# Patient Record
Sex: FEMALE | Race: ASIAN | NOT HISPANIC OR LATINO
[De-identification: names, ages, dates, MRNs, and addresses within clinical notes are randomized per-mention and may not be internally consistent; named-entity substitution may affect disease eponyms.]

---

## 2018-03-22 PROBLEM — Z00.00 ENCOUNTER FOR PREVENTIVE HEALTH EXAMINATION: Status: ACTIVE | Noted: 2018-03-22

## 2018-04-03 ENCOUNTER — APPOINTMENT (OUTPATIENT)
Dept: UROLOGY | Facility: CLINIC | Age: 68
End: 2018-04-03
Payer: MEDICAID

## 2018-04-03 DIAGNOSIS — Z83.3 FAMILY HISTORY OF DIABETES MELLITUS: ICD-10-CM

## 2018-04-03 DIAGNOSIS — Z86.69 PERSONAL HISTORY OF OTHER DISEASES OF THE NERVOUS SYSTEM AND SENSE ORGANS: ICD-10-CM

## 2018-04-03 DIAGNOSIS — Z78.9 OTHER SPECIFIED HEALTH STATUS: ICD-10-CM

## 2018-04-03 DIAGNOSIS — Z82.49 FAMILY HISTORY OF ISCHEMIC HEART DISEASE AND OTHER DISEASES OF THE CIRCULATORY SYSTEM: ICD-10-CM

## 2018-04-03 DIAGNOSIS — Z80.3 FAMILY HISTORY OF MALIGNANT NEOPLASM OF BREAST: ICD-10-CM

## 2018-04-03 DIAGNOSIS — E78.00 PURE HYPERCHOLESTEROLEMIA, UNSPECIFIED: ICD-10-CM

## 2018-04-03 PROCEDURE — 99204 OFFICE O/P NEW MOD 45 MIN: CPT

## 2018-04-05 PROBLEM — Z78.9 DOES NOT USE TOBACCO: Status: ACTIVE | Noted: 2018-04-03

## 2018-04-05 PROBLEM — Z80.3 FAMILY HISTORY OF MALIGNANT NEOPLASM OF BREAST: Status: ACTIVE | Noted: 2018-04-03

## 2018-04-05 PROBLEM — Z83.3 FAMILY HISTORY OF DIABETES MELLITUS: Status: ACTIVE | Noted: 2018-04-03

## 2018-04-05 PROBLEM — E78.00 ELEVATED CHOLESTEROL: Status: ACTIVE | Noted: 2018-04-05

## 2018-04-05 PROBLEM — Z82.49 FAMILY HISTORY OF HYPERTENSION: Status: ACTIVE | Noted: 2018-04-03

## 2018-04-05 PROBLEM — Z86.69 HISTORY OF SCIATICA: Status: ACTIVE | Noted: 2018-04-05

## 2018-04-05 RX ORDER — ATORVASTATIN CALCIUM 80 MG/1
TABLET, FILM COATED ORAL
Refills: 0 | Status: ACTIVE | COMMUNITY

## 2018-04-10 ENCOUNTER — APPOINTMENT (OUTPATIENT)
Dept: UROLOGY | Facility: CLINIC | Age: 68
End: 2018-04-10

## 2018-04-11 ENCOUNTER — APPOINTMENT (OUTPATIENT)
Dept: UROLOGY | Facility: CLINIC | Age: 68
End: 2018-04-11

## 2018-04-12 ENCOUNTER — FORM ENCOUNTER (OUTPATIENT)
Age: 68
End: 2018-04-12

## 2018-04-13 ENCOUNTER — TRANSCRIPTION ENCOUNTER (OUTPATIENT)
Age: 68
End: 2018-04-13

## 2018-04-13 ENCOUNTER — OUTPATIENT (OUTPATIENT)
Dept: OUTPATIENT SERVICES | Facility: HOSPITAL | Age: 68
LOS: 1 days | End: 2018-04-13
Payer: MEDICAID

## 2018-04-13 ENCOUNTER — APPOINTMENT (OUTPATIENT)
Dept: CT IMAGING | Facility: IMAGING CENTER | Age: 68
End: 2018-04-13
Payer: MEDICAID

## 2018-04-13 DIAGNOSIS — N13.30 UNSPECIFIED HYDRONEPHROSIS: ICD-10-CM

## 2018-04-13 PROCEDURE — 74176 CT ABD & PELVIS W/O CONTRAST: CPT

## 2018-04-13 PROCEDURE — 74176 CT ABD & PELVIS W/O CONTRAST: CPT | Mod: 26

## 2018-04-24 ENCOUNTER — APPOINTMENT (OUTPATIENT)
Dept: UROLOGY | Facility: CLINIC | Age: 68
End: 2018-04-24
Payer: MEDICAID

## 2018-04-24 PROCEDURE — 99213 OFFICE O/P EST LOW 20 MIN: CPT

## 2018-05-15 ENCOUNTER — OUTPATIENT (OUTPATIENT)
Dept: OUTPATIENT SERVICES | Facility: HOSPITAL | Age: 68
LOS: 1 days | End: 2018-05-15
Payer: MEDICAID

## 2018-05-15 VITALS
HEART RATE: 82 BPM | RESPIRATION RATE: 16 BRPM | TEMPERATURE: 97 F | SYSTOLIC BLOOD PRESSURE: 128 MMHG | HEIGHT: 62 IN | DIASTOLIC BLOOD PRESSURE: 80 MMHG | WEIGHT: 167.99 LBS

## 2018-05-15 DIAGNOSIS — N13.30 UNSPECIFIED HYDRONEPHROSIS: ICD-10-CM

## 2018-05-15 DIAGNOSIS — Z90.710 ACQUIRED ABSENCE OF BOTH CERVIX AND UTERUS: Chronic | ICD-10-CM

## 2018-05-15 LAB
APPEARANCE UR: CLEAR — SIGNIFICANT CHANGE UP
BILIRUB UR-MCNC: NEGATIVE — SIGNIFICANT CHANGE UP
BLD GP AB SCN SERPL QL: NEGATIVE — SIGNIFICANT CHANGE UP
BLOOD UR QL VISUAL: NEGATIVE — SIGNIFICANT CHANGE UP
BUN SERPL-MCNC: 26 MG/DL — HIGH (ref 7–23)
CALCIUM SERPL-MCNC: 9.6 MG/DL — SIGNIFICANT CHANGE UP (ref 8.4–10.5)
CHLORIDE SERPL-SCNC: 100 MMOL/L — SIGNIFICANT CHANGE UP (ref 98–107)
CO2 SERPL-SCNC: 29 MMOL/L — SIGNIFICANT CHANGE UP (ref 22–31)
COLOR SPEC: SIGNIFICANT CHANGE UP
CREAT SERPL-MCNC: 1.1 MG/DL — SIGNIFICANT CHANGE UP (ref 0.5–1.3)
GLUCOSE SERPL-MCNC: 89 MG/DL — SIGNIFICANT CHANGE UP (ref 70–99)
GLUCOSE UR-MCNC: NEGATIVE — SIGNIFICANT CHANGE UP
HCT VFR BLD CALC: 42.4 % — SIGNIFICANT CHANGE UP (ref 34.5–45)
HGB BLD-MCNC: 13.5 G/DL — SIGNIFICANT CHANGE UP (ref 11.5–15.5)
KETONES UR-MCNC: NEGATIVE — SIGNIFICANT CHANGE UP
LEUKOCYTE ESTERASE UR-ACNC: NEGATIVE — SIGNIFICANT CHANGE UP
MCHC RBC-ENTMCNC: 28.6 PG — SIGNIFICANT CHANGE UP (ref 27–34)
MCHC RBC-ENTMCNC: 31.8 % — LOW (ref 32–36)
MCV RBC AUTO: 89.8 FL — SIGNIFICANT CHANGE UP (ref 80–100)
NITRITE UR-MCNC: NEGATIVE — SIGNIFICANT CHANGE UP
NON-SQ EPI CELLS # UR AUTO: <1 — SIGNIFICANT CHANGE UP
NRBC # FLD: 0 — SIGNIFICANT CHANGE UP
PH UR: 6 — SIGNIFICANT CHANGE UP (ref 4.6–8)
PLATELET # BLD AUTO: 259 K/UL — SIGNIFICANT CHANGE UP (ref 150–400)
PMV BLD: 10.7 FL — SIGNIFICANT CHANGE UP (ref 7–13)
POTASSIUM SERPL-MCNC: 4.2 MMOL/L — SIGNIFICANT CHANGE UP (ref 3.5–5.3)
POTASSIUM SERPL-SCNC: 4.2 MMOL/L — SIGNIFICANT CHANGE UP (ref 3.5–5.3)
PROT UR-MCNC: NEGATIVE MG/DL — SIGNIFICANT CHANGE UP
RBC # BLD: 4.72 M/UL — SIGNIFICANT CHANGE UP (ref 3.8–5.2)
RBC # FLD: 13.2 % — SIGNIFICANT CHANGE UP (ref 10.3–14.5)
RH IG SCN BLD-IMP: POSITIVE — SIGNIFICANT CHANGE UP
SODIUM SERPL-SCNC: 139 MMOL/L — SIGNIFICANT CHANGE UP (ref 135–145)
SP GR SPEC: 1.01 — SIGNIFICANT CHANGE UP (ref 1–1.04)
UROBILINOGEN FLD QL: NORMAL MG/DL — SIGNIFICANT CHANGE UP
WBC # BLD: 7.8 K/UL — SIGNIFICANT CHANGE UP (ref 3.8–10.5)
WBC # FLD AUTO: 7.8 K/UL — SIGNIFICANT CHANGE UP (ref 3.8–10.5)
WBC UR QL: SIGNIFICANT CHANGE UP (ref 0–?)

## 2018-05-15 PROCEDURE — 93010 ELECTROCARDIOGRAM REPORT: CPT

## 2018-05-15 RX ORDER — SODIUM CHLORIDE 9 MG/ML
1000 INJECTION, SOLUTION INTRAVENOUS
Qty: 0 | Refills: 0 | Status: DISCONTINUED | OUTPATIENT
Start: 2018-05-24 | End: 2018-05-24

## 2018-05-15 NOTE — H&P PST ADULT - PROBLEM SELECTOR PLAN 1
Left laparoscopic Nephrectomy 5/24/18.    CBC BMP T&S UA CS EKG    Pre-op instructions and antibacterial soap given and explained

## 2018-05-15 NOTE — H&P PST ADULT - NSANTHOSAYNRD_GEN_A_CORE
No. MONE screening performed.  STOP BANG Legend: 0-2 = LOW Risk; 3-4 = INTERMEDIATE Risk; 5-8 = HIGH Risk

## 2018-05-15 NOTE — H&P PST ADULT - GENITOURINARY COMMENTS
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  Per pt, she has been having left flank pain x 2 months which has been worsening..  Dx with left hydronephrosis, scheduled for  Left laparoscopic Nephrectomy 5/24/18. hx  of left ureteral injury during hysterectomy 1998.   Pt reports she  has been having left flank pain x 2 months which has been worsening.  Dx with left hydronephrosis, scheduled for  Left laparoscopic Nephrectomy 5/24/18.

## 2018-05-15 NOTE — H&P PST ADULT - EKG AND INTERPRETATION
PT aware of NPO status. PT aware of need to hold anticoagulants per protocol. Denies at this time. PT aware of potential for sedation administration and need for  at discharge. PT aware of arrival time pre procedure. Arrive @ 0900 for 1000. Pt states no questions at this time. Discussed holding Cymbalta with pt. Pt very hostile over phone and states that she \"will not hold medication\" prior to procedure. Pt advised of reasons that med would be held. Pt still refused. Advised pt that procedure could be cancelled if med was not held. Advised pt to call referring MD and have MD call office.
on chart

## 2018-05-15 NOTE — H&P PST ADULT - HISTORY OF PRESENT ILLNESS
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  Per pt, she has been having left flank pain x 2 months which has been worsening..  Dx iwth left hydronephrosis, scheduled for  Left laparscopic Nephrectomy 5/24/18. 68 y/o female with hx  of left ureteral injury during hysterectomy 1998.   Pt reports she  has been having left flank pain x 2 months which has been worsening.  Dx with left hydronephrosis, scheduled for  Left laparoscopic Nephrectomy 5/24/18.

## 2018-05-17 LAB
BACTERIA UR CULT: SIGNIFICANT CHANGE UP
SPECIMEN SOURCE: SIGNIFICANT CHANGE UP

## 2018-05-23 ENCOUNTER — TRANSCRIPTION ENCOUNTER (OUTPATIENT)
Age: 68
End: 2018-05-23

## 2018-05-24 ENCOUNTER — INPATIENT (INPATIENT)
Facility: HOSPITAL | Age: 68
LOS: 1 days | Discharge: HOME | End: 2018-05-26
Attending: SPECIALIST | Admitting: SPECIALIST
Payer: MEDICAID

## 2018-05-24 ENCOUNTER — APPOINTMENT (OUTPATIENT)
Dept: UROLOGY | Facility: HOSPITAL | Age: 68
End: 2018-05-24

## 2018-05-24 ENCOUNTER — RESULT REVIEW (OUTPATIENT)
Age: 68
End: 2018-05-24

## 2018-05-24 VITALS
RESPIRATION RATE: 16 BRPM | HEIGHT: 62 IN | OXYGEN SATURATION: 99 % | TEMPERATURE: 99 F | HEART RATE: 92 BPM | DIASTOLIC BLOOD PRESSURE: 80 MMHG | WEIGHT: 167.99 LBS | SYSTOLIC BLOOD PRESSURE: 164 MMHG

## 2018-05-24 DIAGNOSIS — N13.30 UNSPECIFIED HYDRONEPHROSIS: ICD-10-CM

## 2018-05-24 DIAGNOSIS — Z90.710 ACQUIRED ABSENCE OF BOTH CERVIX AND UTERUS: Chronic | ICD-10-CM

## 2018-05-24 LAB — RH IG SCN BLD-IMP: POSITIVE — SIGNIFICANT CHANGE UP

## 2018-05-24 PROCEDURE — 88305 TISSUE EXAM BY PATHOLOGIST: CPT | Mod: 26

## 2018-05-24 PROCEDURE — 50545 LAPARO RADICAL NEPHRECTOMY: CPT | Mod: LT

## 2018-05-24 PROCEDURE — 88307 TISSUE EXAM BY PATHOLOGIST: CPT | Mod: 26

## 2018-05-24 RX ORDER — DOCUSATE SODIUM 100 MG
100 CAPSULE ORAL THREE TIMES A DAY
Qty: 0 | Refills: 0 | Status: DISCONTINUED | OUTPATIENT
Start: 2018-05-24 | End: 2018-05-26

## 2018-05-24 RX ORDER — BENZOCAINE AND MENTHOL 5; 1 G/100ML; G/100ML
1 LIQUID ORAL
Qty: 0 | Refills: 0 | Status: DISCONTINUED | OUTPATIENT
Start: 2018-05-24 | End: 2018-05-26

## 2018-05-24 RX ORDER — SODIUM CHLORIDE 9 MG/ML
1000 INJECTION, SOLUTION INTRAVENOUS
Qty: 0 | Refills: 0 | Status: DISCONTINUED | OUTPATIENT
Start: 2018-05-24 | End: 2018-05-25

## 2018-05-24 RX ORDER — MORPHINE SULFATE 50 MG/1
6 CAPSULE, EXTENDED RELEASE ORAL EVERY 4 HOURS
Qty: 0 | Refills: 0 | Status: DISCONTINUED | OUTPATIENT
Start: 2018-05-24 | End: 2018-05-26

## 2018-05-24 RX ORDER — ATORVASTATIN CALCIUM 80 MG/1
20 TABLET, FILM COATED ORAL AT BEDTIME
Qty: 0 | Refills: 0 | Status: DISCONTINUED | OUTPATIENT
Start: 2018-05-24 | End: 2018-05-26

## 2018-05-24 RX ORDER — ONDANSETRON 8 MG/1
4 TABLET, FILM COATED ORAL EVERY 6 HOURS
Qty: 0 | Refills: 0 | Status: DISCONTINUED | OUTPATIENT
Start: 2018-05-24 | End: 2018-05-26

## 2018-05-24 RX ORDER — HEPARIN SODIUM 5000 [USP'U]/ML
5000 INJECTION INTRAVENOUS; SUBCUTANEOUS EVERY 8 HOURS
Qty: 0 | Refills: 0 | Status: DISCONTINUED | OUTPATIENT
Start: 2018-05-24 | End: 2018-05-26

## 2018-05-24 RX ORDER — OXYCODONE AND ACETAMINOPHEN 5; 325 MG/1; MG/1
1 TABLET ORAL EVERY 4 HOURS
Qty: 0 | Refills: 0 | Status: DISCONTINUED | OUTPATIENT
Start: 2018-05-24 | End: 2018-05-26

## 2018-05-24 RX ORDER — GABAPENTIN 400 MG/1
200 CAPSULE ORAL DAILY
Qty: 0 | Refills: 0 | Status: DISCONTINUED | OUTPATIENT
Start: 2018-05-24 | End: 2018-05-26

## 2018-05-24 RX ORDER — HYDROMORPHONE HYDROCHLORIDE 2 MG/ML
0.5 INJECTION INTRAMUSCULAR; INTRAVENOUS; SUBCUTANEOUS
Qty: 0 | Refills: 0 | Status: DISCONTINUED | OUTPATIENT
Start: 2018-05-24 | End: 2018-05-26

## 2018-05-24 RX ORDER — SENNA PLUS 8.6 MG/1
2 TABLET ORAL AT BEDTIME
Qty: 0 | Refills: 0 | Status: DISCONTINUED | OUTPATIENT
Start: 2018-05-24 | End: 2018-05-26

## 2018-05-24 RX ORDER — DULOXETINE HYDROCHLORIDE 30 MG/1
20 CAPSULE, DELAYED RELEASE ORAL DAILY
Qty: 0 | Refills: 0 | Status: DISCONTINUED | OUTPATIENT
Start: 2018-05-24 | End: 2018-05-26

## 2018-05-24 RX ORDER — OXYCODONE AND ACETAMINOPHEN 5; 325 MG/1; MG/1
2 TABLET ORAL EVERY 4 HOURS
Qty: 0 | Refills: 0 | Status: DISCONTINUED | OUTPATIENT
Start: 2018-05-24 | End: 2018-05-26

## 2018-05-24 RX ADMIN — HYDROMORPHONE HYDROCHLORIDE 0.5 MILLIGRAM(S): 2 INJECTION INTRAMUSCULAR; INTRAVENOUS; SUBCUTANEOUS at 13:44

## 2018-05-24 RX ADMIN — ATORVASTATIN CALCIUM 20 MILLIGRAM(S): 80 TABLET, FILM COATED ORAL at 21:29

## 2018-05-24 RX ADMIN — Medication 100 MILLIGRAM(S): at 21:29

## 2018-05-24 RX ADMIN — HEPARIN SODIUM 5000 UNIT(S): 5000 INJECTION INTRAVENOUS; SUBCUTANEOUS at 21:29

## 2018-05-24 RX ADMIN — OXYCODONE AND ACETAMINOPHEN 2 TABLET(S): 5; 325 TABLET ORAL at 21:29

## 2018-05-24 RX ADMIN — OXYCODONE AND ACETAMINOPHEN 2 TABLET(S): 5; 325 TABLET ORAL at 22:30

## 2018-05-24 RX ADMIN — SODIUM CHLORIDE 125 MILLILITER(S): 9 INJECTION, SOLUTION INTRAVENOUS at 12:02

## 2018-05-24 RX ADMIN — HEPARIN SODIUM 5000 UNIT(S): 5000 INJECTION INTRAVENOUS; SUBCUTANEOUS at 14:29

## 2018-05-24 RX ADMIN — HYDROMORPHONE HYDROCHLORIDE 0.5 MILLIGRAM(S): 2 INJECTION INTRAMUSCULAR; INTRAVENOUS; SUBCUTANEOUS at 14:00

## 2018-05-24 NOTE — PATIENT PROFILE ADULT. - --DESCRIBE SURGICAL SITE
abdominal steries dry and intact, left lower quadrant dressing with small amount serosanguinous drainage

## 2018-05-24 NOTE — BRIEF OPERATIVE NOTE - PROCEDURE
<<-----Click on this checkbox to enter Procedure Laparoscopic simple left nephrectomy  05/24/2018    Active  SGURRAM

## 2018-05-24 NOTE — PROGRESS NOTE ADULT - SUBJECTIVE AND OBJECTIVE BOX
Note    Post op Check    s/p : Left lap simple nephrectomy    Pt seen / examined complain of throat pain and L sided abdominal pain - adequately controlled.    Vital Signs Last 24 Hrs  T(C): 36 (24 May 2018 13:00), Max: 37 (24 May 2018 06:40)  T(F): 96.8 (24 May 2018 13:00), Max: 98.6 (24 May 2018 06:40)  HR: 80 (24 May 2018 13:45) (74 - 98)  BP: 146/70 (24 May 2018 13:45) (117/83 - 164/80)  BP(mean): --  RR: 16 (24 May 2018 13:45) (9 - 16)  SpO2: 100% (24 May 2018 13:45) (99% - 100%)    I&O's Summary    24 May 2018 07:01  -  24 May 2018 14:14  --------------------------------------------------------  IN: 125 mL / OUT: 350 mL / NET: -225 mL    Fol=400 yellow    PHYSICAL EXAM:       Constitutional: awake alert NAD    Respiratory: no resp distress     Cardiovascular: RRR    Gastrointestinal: softly distended, trocar and incision site CDI, appropriately tender    Genitourinary: juarez in place -yellow    Extremities: AROM x 4 (+) venodynes

## 2018-05-24 NOTE — PROGRESS NOTE ADULT - PROBLEM SELECTOR PLAN 1
Strict I&O's  Analgesia Antiemetics  cepacol lozenges  DVT prophyllaxis  Incentive spirometry  Clears / OOB  AM labs

## 2018-05-25 ENCOUNTER — TRANSCRIPTION ENCOUNTER (OUTPATIENT)
Age: 68
End: 2018-05-25

## 2018-05-25 DIAGNOSIS — M54.30 SCIATICA, UNSPECIFIED SIDE: ICD-10-CM

## 2018-05-25 DIAGNOSIS — R11.2 NAUSEA WITH VOMITING, UNSPECIFIED: ICD-10-CM

## 2018-05-25 DIAGNOSIS — E78.5 HYPERLIPIDEMIA, UNSPECIFIED: ICD-10-CM

## 2018-05-25 DIAGNOSIS — Z29.9 ENCOUNTER FOR PROPHYLACTIC MEASURES, UNSPECIFIED: ICD-10-CM

## 2018-05-25 LAB
BASOPHILS # BLD AUTO: 0.01 K/UL — SIGNIFICANT CHANGE UP (ref 0–0.2)
BASOPHILS NFR BLD AUTO: 0.1 % — SIGNIFICANT CHANGE UP (ref 0–2)
BLD GP AB SCN SERPL QL: NEGATIVE — SIGNIFICANT CHANGE UP
BUN SERPL-MCNC: 14 MG/DL — SIGNIFICANT CHANGE UP (ref 7–23)
CALCIUM SERPL-MCNC: 8.7 MG/DL — SIGNIFICANT CHANGE UP (ref 8.4–10.5)
CHLORIDE SERPL-SCNC: 100 MMOL/L — SIGNIFICANT CHANGE UP (ref 98–107)
CO2 SERPL-SCNC: 26 MMOL/L — SIGNIFICANT CHANGE UP (ref 22–31)
CREAT SERPL-MCNC: 0.99 MG/DL — SIGNIFICANT CHANGE UP (ref 0.5–1.3)
EOSINOPHIL # BLD AUTO: 0 K/UL — SIGNIFICANT CHANGE UP (ref 0–0.5)
EOSINOPHIL NFR BLD AUTO: 0 % — SIGNIFICANT CHANGE UP (ref 0–6)
GLUCOSE SERPL-MCNC: 134 MG/DL — HIGH (ref 70–99)
HCT VFR BLD CALC: 34.1 % — LOW (ref 34.5–45)
HGB BLD-MCNC: 10.9 G/DL — LOW (ref 11.5–15.5)
IMM GRANULOCYTES # BLD AUTO: 0.04 # — SIGNIFICANT CHANGE UP
IMM GRANULOCYTES NFR BLD AUTO: 0.4 % — SIGNIFICANT CHANGE UP (ref 0–1.5)
LYMPHOCYTES # BLD AUTO: 1.52 K/UL — SIGNIFICANT CHANGE UP (ref 1–3.3)
LYMPHOCYTES # BLD AUTO: 14.5 % — SIGNIFICANT CHANGE UP (ref 13–44)
MCHC RBC-ENTMCNC: 28.6 PG — SIGNIFICANT CHANGE UP (ref 27–34)
MCHC RBC-ENTMCNC: 32 % — SIGNIFICANT CHANGE UP (ref 32–36)
MCV RBC AUTO: 89.5 FL — SIGNIFICANT CHANGE UP (ref 80–100)
MONOCYTES # BLD AUTO: 0.59 K/UL — SIGNIFICANT CHANGE UP (ref 0–0.9)
MONOCYTES NFR BLD AUTO: 5.6 % — SIGNIFICANT CHANGE UP (ref 2–14)
NEUTROPHILS # BLD AUTO: 8.29 K/UL — HIGH (ref 1.8–7.4)
NEUTROPHILS NFR BLD AUTO: 79.4 % — HIGH (ref 43–77)
NRBC # FLD: 0 — SIGNIFICANT CHANGE UP
PLATELET # BLD AUTO: 242 K/UL — SIGNIFICANT CHANGE UP (ref 150–400)
PMV BLD: 10.4 FL — SIGNIFICANT CHANGE UP (ref 7–13)
POTASSIUM SERPL-MCNC: 4.8 MMOL/L — SIGNIFICANT CHANGE UP (ref 3.5–5.3)
POTASSIUM SERPL-SCNC: 4.8 MMOL/L — SIGNIFICANT CHANGE UP (ref 3.5–5.3)
RBC # BLD: 3.81 M/UL — SIGNIFICANT CHANGE UP (ref 3.8–5.2)
RBC # FLD: 13.4 % — SIGNIFICANT CHANGE UP (ref 10.3–14.5)
RH IG SCN BLD-IMP: POSITIVE — SIGNIFICANT CHANGE UP
SODIUM SERPL-SCNC: 139 MMOL/L — SIGNIFICANT CHANGE UP (ref 135–145)
WBC # BLD: 10.45 K/UL — SIGNIFICANT CHANGE UP (ref 3.8–10.5)
WBC # FLD AUTO: 10.45 K/UL — SIGNIFICANT CHANGE UP (ref 3.8–10.5)

## 2018-05-25 PROCEDURE — 99223 1ST HOSP IP/OBS HIGH 75: CPT

## 2018-05-25 RX ORDER — METOCLOPRAMIDE HCL 10 MG
10 TABLET ORAL EVERY 6 HOURS
Qty: 0 | Refills: 0 | Status: DISCONTINUED | OUTPATIENT
Start: 2018-05-25 | End: 2018-05-26

## 2018-05-25 RX ORDER — SODIUM CHLORIDE 9 MG/ML
1000 INJECTION, SOLUTION INTRAVENOUS
Qty: 0 | Refills: 0 | Status: DISCONTINUED | OUTPATIENT
Start: 2018-05-25 | End: 2018-05-25

## 2018-05-25 RX ADMIN — BENZOCAINE AND MENTHOL 1 LOZENGE: 5; 1 LIQUID ORAL at 10:28

## 2018-05-25 RX ADMIN — Medication 100 MILLIGRAM(S): at 22:16

## 2018-05-25 RX ADMIN — ONDANSETRON 4 MILLIGRAM(S): 8 TABLET, FILM COATED ORAL at 07:16

## 2018-05-25 RX ADMIN — HEPARIN SODIUM 5000 UNIT(S): 5000 INJECTION INTRAVENOUS; SUBCUTANEOUS at 22:16

## 2018-05-25 RX ADMIN — OXYCODONE AND ACETAMINOPHEN 2 TABLET(S): 5; 325 TABLET ORAL at 09:25

## 2018-05-25 RX ADMIN — Medication 100 MILLIGRAM(S): at 05:34

## 2018-05-25 RX ADMIN — DULOXETINE HYDROCHLORIDE 20 MILLIGRAM(S): 30 CAPSULE, DELAYED RELEASE ORAL at 22:16

## 2018-05-25 RX ADMIN — ATORVASTATIN CALCIUM 20 MILLIGRAM(S): 80 TABLET, FILM COATED ORAL at 22:16

## 2018-05-25 RX ADMIN — HEPARIN SODIUM 5000 UNIT(S): 5000 INJECTION INTRAVENOUS; SUBCUTANEOUS at 14:38

## 2018-05-25 RX ADMIN — GABAPENTIN 200 MILLIGRAM(S): 400 CAPSULE ORAL at 22:18

## 2018-05-25 RX ADMIN — Medication 10 MILLIGRAM(S): at 12:47

## 2018-05-25 RX ADMIN — HEPARIN SODIUM 5000 UNIT(S): 5000 INJECTION INTRAVENOUS; SUBCUTANEOUS at 05:34

## 2018-05-25 RX ADMIN — OXYCODONE AND ACETAMINOPHEN 2 TABLET(S): 5; 325 TABLET ORAL at 10:04

## 2018-05-25 NOTE — DISCHARGE NOTE ADULT - PLAN OF CARE
Recovery WOUND CARE:  Clean the area with warm soapy water, pat dry.   BATHING: Please do not submerge wound underwater. You may shower and/or sponge bathe.  ACTIVITY: No heavy lifting or straining. Otherwise, you may return to your usual level of physical activity. If you are taking narcotic pain medication (such as Percocet) DO NOT drive a car, operate machinery or make important decisions.  DIET: You may resume your regular diet.  NOTIFY YOUR SURGEON IF: You have any bleeding that does not stop, any pus draining from your wound(s), any fever (over 100.4 F) or chills, persistent nausea/vomiting, persistent diarrhea, or if your pain is not controlled on your discharge pain medications.  FOLLOW-UP: Please follow up with your primary care physician in week regarding your hospitalization. You may have a small amount in your urine for a few more days, drink plenty of fluids.  If urine becomes thick red or with clots, or you are unable to urinate, please call the office.  WOUND CARE:  Clean the area with warm soapy water, pat dry.   BATHING: Please do not submerge wound underwater. You may shower and/or sponge bathe.  ACTIVITY: No heavy lifting or straining. Otherwise, you may return to your usual level of physical activity. If you are taking narcotic pain medication (such as Percocet) DO NOT drive a car, operate machinery or make important decisions.  DIET: You may resume your regular diet.  NOTIFY YOUR SURGEON IF: You have any bleeding that does not stop, any pus draining from your wound(s), any fever (over 100.4 F) or chills, persistent nausea/vomiting, persistent diarrhea, or if your pain is not controlled on your discharge pain medications.  FOLLOW-UP: Call Dr. Diaz's office to schedule a follow up appointment for follow up.  Please follow up with your primary care physician in week regarding your hospitalization. Continue current home medications and follow up with your primary care provider

## 2018-05-25 NOTE — DISCHARGE NOTE ADULT - HOSPITAL COURSE
Pt underwent uncomplicated  lap partial nephrectomy on  5/24/18.  Postoperative course uneventful, successful TOV on POD #1,  pain controlled, ambulating.  Return of GI function on POD#1, though diet not immediately advanced due to nausea and small volume emesisx2.  Diet advanced without issue on POD#2.   Pt d/c on POD#2 to f/u with Dr. Diaz.  I-stop checked. Pt underwent uncomplicated  lap partial nephrectomy on  5/24/18.  Postoperative course uneventful, successful TOV on POD #1,  pain controlled, ambulating.  Return of GI function on POD#1, though diet not immediately advanced due to nausea and small volume emesis x 2 which resolved on POD #1.  Diet advanced without issue on POD#2. POD #2 pt voided light red urine that became lighter throughout the day, CBC and VS stable.   Pt d/c on POD#2 to f/u with Dr. Diaz.  I-stop checked.

## 2018-05-25 NOTE — PROGRESS NOTE ADULT - PROBLEM SELECTOR PLAN 1
Continue CLD, monitor GI function  IVM  F/U AM BMP  D/c juarez, monitor  UO  DVT prophy, IS  OOB, ambulate

## 2018-05-25 NOTE — CONSULT NOTE ADULT - SUBJECTIVE AND OBJECTIVE BOX
Patient is a 67 y old  Female who presents with a chief complaint of "left hydronephrosis"      HPI:  68 y/o female with hx  of left ureteral injury during hysterectomy 1998.   Pt reports she  has been having left flank pain x 2 months which has been worsening.  Dx with left hydronephrosis, admitted  for  Left laparoscopic Nephrectomy 5/24/18. POD#1. Pt c/o nausea, vomitted X2. (+) a lot of Flatus. No BM      History limited due to: [ ] Dementia  [ ] Delirium  [ ] Condition    Pain Symptoms if applicable:             	                         none	   mild          Pain:	                            0	    1-3	      Location: [facility] site	  Modifying factors:	  Associated symptoms:	    Function: [x ] Independent  [ ] Assistance  [ ] Total care  [ ] Non-ambulatory    Allergies    No Known Allergies    Intolerances    aspirin (Other)      HOME MEDICATIONS: [x ] Reviewed    MEDICATIONS  (STANDING):  atorvastatin 20 milliGRAM(s) Oral at bedtime  bisacodyl Suppository 10 milliGRAM(s) Rectal once  dextrose 5% + sodium chloride 0.45%. 1000 milliLiter(s) (75 mL/Hr) IV Continuous <Continuous>  docusate sodium 100 milliGRAM(s) Oral three times a day  DULoxetine 20 milliGRAM(s) Oral daily  gabapentin 200 milliGRAM(s) Oral daily  heparin  Injectable 5000 Unit(s) SubCutaneous every 8 hours    MEDICATIONS  (PRN):  benzocaine 15 mG/menthol 3.6 mG Lozenge 1 Lozenge Oral every 1 hour PRN Sore Throat  HYDROmorphone  Injectable 0.5 milliGRAM(s) IV Push every 10 minutes PRN Severe Pain (7 - 10)  metoclopramide Injectable 10 milliGRAM(s) IV Push every 6 hours PRN nausea/vomiting  morphine  - Injectable 6 milliGRAM(s) IV Push every 4 hours PRN Severe Pain  ondansetron Injectable 4 milliGRAM(s) IV Push every 6 hours PRN Nausea and/or Vomiting  oxyCODONE    5 mG/acetaminophen 325 mG 1 Tablet(s) Oral every 4 hours PRN Mild Pain (1 - 3)  oxyCODONE    5 mG/acetaminophen 325 mG 2 Tablet(s) Oral every 4 hours PRN Moderate Pain (4 - 6)  senna 2 Tablet(s) Oral at bedtime PRN Constipation      PAST MEDICAL & SURGICAL HISTORY:  Obesity  Hyperlipidemia  Sciatica  Hydronephrosis: (L)  H/O: hysterectomy: 1998  [x ] Reviewed     SOCIAL HISTORY:  Residence: [ ] [facility]  [ ] SNF  [x ] Community  [ ] Substance abuse: denies  [ ] Tobacco: denies  [ ] Alcohol use: denies    FAMILY HISTORY:  Family history of diabetes mellitus (Sibling)  [ ] No pertinent family history in first degree relatives     REVIEW OF SYSTEMS:    CONSTITUTIONAL: No fever, weight loss, or fatigue  EYES: No eye pain, visual disturbances, or discharge  ENMT:  No difficulty hearing, tinnitus, vertigo; No sinus or throat pain  NECK: No pain or stiffness  BREASTS: No pain, masses, or nipple discharge  RESPIRATORY: No cough, wheezing, chills or hemoptysis; No shortness of breath  CARDIOVASCULAR: No chest pain, palpitations, dizziness, or leg swelling  GASTROINTESTINAL: No abdominal or epigastric pain. (+) nausea, vomiting, No hematemesis; passing gas, no BM  GENITOURINARY: No dysuria, frequency, hematuria, or incontinence  NEUROLOGICAL: No headaches, memory loss, loss of strength, numbness, or tremors  SKIN: No itching, burning, rashes, or lesions   LYMPH NODES: No enlarged glands  ENDOCRINE: No heat or cold intolerance; No hair loss  MUSCULOSKELETAL: (+) back pain shooting to left leg  PSYCHIATRIC: No depression, anxiety, mood swings, or difficulty sleeping  HEME/LYMPH: No easy bruising, or bleeding gums  ALLERGY AND IMMUNOLOGIC: No hives or eczema    [  ] All other ROS negative  [  ] Unable to obtain due to poor mental status    Vital Signs Last 24 Hrs  T(C): 36.9 (25 May 2018 09:08), Max: 37 (24 May 2018 15:00)  T(F): 98.5 (25 May 2018 09:08), Max: 98.6 (24 May 2018 15:00)  HR: 79 (25 May 2018 09:08) (63 - 100)  BP: 127/62 (25 May 2018 09:08) (105/45 - 146/70)  BP(mean): --  RR: 18 (25 May 2018 09:08) (9 - 18)  SpO2: 99% (25 May 2018 09:08) (95% - 100%)    PHYSICAL EXAM:    GENERAL: NAD, well-groomed, well-developed  HEAD:  Atraumatic, Normocephalic  EYES: EOMI, PERRLA, conjunctiva and sclera clear  ENMT: Moist mucous membranes  NECK: Supple, No JVD  RESPIRATORY: Clear to auscultation bilaterally; No rales, rhonchi, wheezing, or rubs  CARDIOVASCULAR: Regular rate and rhythm; No murmurs, rubs, or gallops  GASTROINTESTINAL: Soft, Nontender, Nondistended; Bowel sounds present  GENITOURINARY: Not examined  EXTREMITIES:  2+ Peripheral Pulses, No clubbing, cyanosis, or edema  NERVOUS SYSTEM:  Alert & Oriented X3; Moving all 4 extremities; No gross sensory deficits  HEME/LYMPH: No lymphadenopathy noted  SKIN: No rashes or lesions; Incisions C/D/I    LABS:                        10.9   10.45 )-----------( 242      ( 25 May 2018 05:58 )             34.1     05-25    139  |  100  |  14  ----------------------------<  134<H>  4.8   |  26  |  0.99    Ca    8.7      25 May 2018 05:58          CAPILLARY BLOOD GLUCOSE          RADIOLOGY & ADDITIONAL STUDIES:    EKG:   Personally Reviewed:  [x ] YES NSR, No ST TW changes    Imaging:   Personally Reviewed:  [ ] YES               Consultant(s) notes reviewed:    Care Discussed with Consultant(s)/Other Providers:    Advanced Directives: [ ] DNR  [ ] No feeding tube  [ ] MOLST in chart  [ ] MOLST completed today  [ ] Unknown

## 2018-05-25 NOTE — DISCHARGE NOTE ADULT - MEDICATION SUMMARY - MEDICATIONS TO TAKE
I will START or STAY ON the medications listed below when I get home from the hospital:    oxyCODONE-acetaminophen 5 mg-325 mg oral tablet  -- 1 to 2 tab(s) by mouth every 4 to 6 hours, As needed, For Pain MDD:6  -- Indication: For Pain    gabapentin 100 mg oral tablet  -- 2 tab(s) by mouth once a day (at bedtime)  -- Indication: For Home med    DULoxetine 20 mg oral delayed release capsule  -- 1 tab(s) by mouth once a day  -- Indication: For Home med    atorvastatin 20 mg oral tablet  -- 1 tab(s) by mouth once a day  -- Indication: For Home med    senna oral tablet  -- 2 tab(s) by mouth once a day (at bedtime), As needed, Constipation  -- Indication: For Constipation    docusate sodium 100 mg oral capsule  -- 1 cap(s) by mouth 3 times a day  -- Indication: For Constipation    Calcium 600+D 600 mg-200 intl units oral tablet  -- 1 tab(s) by mouth once a day  -- Indication: For Home med    Vitamin D3 2000 intl units oral capsule  -- 1 cap(s) by mouth once a day  -- Indication: For Home med    biotin 5000 mcg oral tablet, disintegrating  -- 1 tab(s) by mouth once a day  -- Indication: For Home med

## 2018-05-25 NOTE — DISCHARGE NOTE ADULT - CARE PROVIDER_API CALL
Jose Juan Diaz), Urology  [address] [place] [address]  Phone: [phone]  Fax: [phone]

## 2018-05-25 NOTE — DISCHARGE NOTE ADULT - CARE PLAN
Principal Discharge DX:	Hydronephrosis  Goal:	Recovery  Assessment and plan of treatment:	WOUND CARE:  Clean the area with warm soapy water, pat dry.   BATHING: Please do not submerge wound underwater. You may shower and/or sponge bathe.  ACTIVITY: No heavy lifting or straining. Otherwise, you may return to your usual level of physical activity. If you are taking narcotic pain medication (such as Percocet) DO NOT drive a car, operate machinery or make important decisions.  DIET: You may resume your regular diet.  NOTIFY YOUR SURGEON IF: You have any bleeding that does not stop, any pus draining from your wound(s), any fever (over 100.4 F) or chills, persistent nausea/vomiting, persistent diarrhea, or if your pain is not controlled on your discharge pain medications.  FOLLOW-UP: Please follow up with your primary care physician in week regarding your hospitalization. Principal Discharge DX:	Hydronephrosis  Goal:	Recovery  Assessment and plan of treatment:	You may have a small amount in your urine for a few more days, drink plenty of fluids.  If urine becomes thick red or with clots, or you are unable to urinate, please call the office.  WOUND CARE:  Clean the area with warm soapy water, pat dry.   BATHING: Please do not submerge wound underwater. You may shower and/or sponge bathe.  ACTIVITY: No heavy lifting or straining. Otherwise, you may return to your usual level of physical activity. If you are taking narcotic pain medication (such as Percocet) DO NOT drive a car, operate machinery or make important decisions.  DIET: You may resume your regular diet.  NOTIFY YOUR SURGEON IF: You have any bleeding that does not stop, any pus draining from your wound(s), any fever (over 100.4 F) or chills, persistent nausea/vomiting, persistent diarrhea, or if your pain is not controlled on your discharge pain medications.  FOLLOW-UP: Call Dr. Diaz's office to schedule a follow up appointment for follow up.  Please follow up with your primary care physician in week regarding your hospitalization.  Secondary Diagnosis:	Hyperlipidemia  Assessment and plan of treatment:	Continue current home medications and follow up with your primary care provider

## 2018-05-25 NOTE — DISCHARGE NOTE ADULT - ADDITIONAL INSTRUCTIONS
Please make an appointment to see Dr. Diaz in 2 weeks Please follow up with your primary care physician regarding your hospitalization within 2 weeks after your discharge.

## 2018-05-25 NOTE — DISCHARGE NOTE ADULT - PATIENT PORTAL LINK FT
You can access the [facility] Portal, offered by [facility], by registering with the following website: [email or web address]

## 2018-05-25 NOTE — DISCHARGE NOTE ADULT - INSTRUCTIONS
Make a follow up appointment with Dr. Diaz. Call MD if you develop a fever, or if there is redness, swelling, drainage or pain not relieved by pain medication. No heavy lifting, bending, or straining to move your bowels. Take over the counter stool softeners as needed to prevent constipation which may be caused by pain medication. Drink plenty of liquids. None Drink plenty of fluids

## 2018-05-25 NOTE — DISCHARGE NOTE ADULT - PRINCIPAL DIAGNOSIS
ORIF right bimalleolar ankle fracture     Is patient elevating above heart level?  yes  Is patient icing behind the knee?  yes  Any abdominal pain?  no  How is pts pain/how often are they taking pain meds? 2/10;   Some trouble last night getting ahead of the pain, but patient states they got is sorted out and he is doing well now. Taking meds every 4-5 hours - 2 oxy and 1 hydroxyzine   Pt informed ace wrap can be loosened to help with pain.  Any questions?  Not at this time  Pt has the Triage phone number in case of questions.  Discussed the above with Dr. Gary.         Hydronephrosis

## 2018-05-25 NOTE — PROGRESS NOTE ADULT - SUBJECTIVE AND OBJECTIVE BOX
ANESTHESIA POSTOP CHECK    67y Female POSTOP DAY 1 S/P left nephrectomy    Vital Signs Last 24 Hrs  T(C): 36.9 (25 May 2018 09:08), Max: 37 (24 May 2018 15:00)  T(F): 98.5 (25 May 2018 09:08), Max: 98.6 (24 May 2018 15:00)  HR: 79 (25 May 2018 09:08) (63 - 100)  BP: 127/62 (25 May 2018 09:08) (105/45 - 146/70)  BP(mean): --  RR: 18 (25 May 2018 09:08) (9 - 18)  SpO2: 99% (25 May 2018 09:08) (95% - 100%)  I&O's Summary    24 May 2018 07:01  -  25 May 2018 07:00  --------------------------------------------------------  IN: 375 mL / OUT: 2125 mL / NET: -1750 mL    25 May 2018 07:01  -  25 May 2018 09:58  --------------------------------------------------------  IN: 0 mL / OUT: 200 mL / NET: -200 mL        [x ] NO APPARENT ANESTHESIA COMPLICATIONS

## 2018-05-25 NOTE — PROGRESS NOTE ADULT - SUBJECTIVE AND OBJECTIVE BOX
Overnight events:  None    Subjective:  Pt states she had a good night, + flatus, had one episode of small clear vomitus just after rounds, feels fine now    Objective:    Vital signs  T(C): , Max: 37 (05-24-18 @ 15:00)  HR: 63 (05-25-18 @ 05:33)  BP: 114/59 (05-25-18 @ 05:33)  SpO2: 97% (05-25-18 @ 05:33)  Wt(kg): --    Output   Diaz: 975      Gen: NAD  Abd: lesley c/d/i, soft, nontender, nondistended  : larry removed on rounds    Labs                        10.9   10.45 )-----------( 242      ( 25 May 2018 05:58 )             34.1

## 2018-05-25 NOTE — DISCHARGE NOTE ADULT - CONDITIONS AT DISCHARGE
Pt. is afebrile and offers no complaints. In no acute distress. Abdominal scope sites: clean, dry and intact. Pt is ambulating, tolerating diet well, and voiding in adequate amounts.

## 2018-05-25 NOTE — CONSULT NOTE ADULT - PROBLEM SELECTOR RECOMMENDATION 9
S/P OR. POD#1  management as per   DVT prophylaxis  pain control  Bowel regimen  Monitor for GI function  Incentive spirometer  OOB

## 2018-05-25 NOTE — CONSULT NOTE ADULT - PROBLEM SELECTOR RECOMMENDATION 2
Likely due to anesthesia or ileus.  Continue NPO  IVF hydration  Zofran PRN  Consider compazine 10mg IVP Q6H PRN

## 2018-05-25 NOTE — DISCHARGE NOTE ADULT - NS AS DC FOLLOWUP STROKE INST
Influenza vaccination (VIS Pub Date: August 7, 2015)/lap partial nephrectomy, percocet/Smoking Cessation Smoking Cessation/lap partial nephrectomy, percocet

## 2018-05-26 VITALS
OXYGEN SATURATION: 98 % | HEART RATE: 76 BPM | SYSTOLIC BLOOD PRESSURE: 131 MMHG | RESPIRATION RATE: 17 BRPM | TEMPERATURE: 98 F | DIASTOLIC BLOOD PRESSURE: 62 MMHG

## 2018-05-26 LAB
APTT BLD: 38 SEC — HIGH (ref 27.5–37.4)
HCT VFR BLD CALC: 34.7 % — SIGNIFICANT CHANGE UP (ref 34.5–45)
HGB BLD-MCNC: 10.9 G/DL — LOW (ref 11.5–15.5)
INR BLD: 0.94 — SIGNIFICANT CHANGE UP (ref 0.88–1.17)
MCHC RBC-ENTMCNC: 28.2 PG — SIGNIFICANT CHANGE UP (ref 27–34)
MCHC RBC-ENTMCNC: 31.4 % — LOW (ref 32–36)
MCV RBC AUTO: 89.7 FL — SIGNIFICANT CHANGE UP (ref 80–100)
NRBC # FLD: 0.02 — SIGNIFICANT CHANGE UP
PLATELET # BLD AUTO: 243 K/UL — SIGNIFICANT CHANGE UP (ref 150–400)
PMV BLD: 10.9 FL — SIGNIFICANT CHANGE UP (ref 7–13)
PROTHROM AB SERPL-ACNC: 10.8 SEC — SIGNIFICANT CHANGE UP (ref 9.8–13.1)
RBC # BLD: 3.87 M/UL — SIGNIFICANT CHANGE UP (ref 3.8–5.2)
RBC # FLD: 13.7 % — SIGNIFICANT CHANGE UP (ref 10.3–14.5)
WBC # BLD: 14.03 K/UL — HIGH (ref 3.8–10.5)
WBC # FLD AUTO: 14.03 K/UL — HIGH (ref 3.8–10.5)

## 2018-05-26 RX ORDER — DOCUSATE SODIUM 100 MG
1 CAPSULE ORAL
Qty: 0 | Refills: 0 | DISCHARGE
Start: 2018-05-26

## 2018-05-26 RX ORDER — SENNA PLUS 8.6 MG/1
2 TABLET ORAL
Qty: 0 | Refills: 0 | DISCHARGE
Start: 2018-05-26

## 2018-05-26 RX ADMIN — HEPARIN SODIUM 5000 UNIT(S): 5000 INJECTION INTRAVENOUS; SUBCUTANEOUS at 06:06

## 2018-05-26 RX ADMIN — Medication 100 MILLIGRAM(S): at 06:06

## 2018-05-26 RX ADMIN — Medication 100 MILLIGRAM(S): at 12:43

## 2018-05-26 NOTE — PROGRESS NOTE ADULT - SUBJECTIVE AND OBJECTIVE BOX
Overnight events:  Pt had N/V yesterday, abdomen remained soft and was still passing gas, made NPO, symptoms resolved  This am voided light red urine with one small clot    Subjective:  Pt offers no complaints, feels well, no further N/V, + flatus    Objective:    Vital signs  T(C): , Max: 36.8 (05-26-18 @ 06:01)  HR: 71 (05-26-18 @ 06:01)  BP: 141/64 (05-26-18 @ 06:01)  SpO2: 100% (05-26-18 @ 06:01)  Wt(kg): --    Output   Void: 1150        Gen: NAD  Abd: incisions c/d/i, soft, nontender      Labs: p

## 2018-05-26 NOTE — PROGRESS NOTE ADULT - PROBLEM SELECTOR PLAN 1
Regular diet  CBC/coags now  Rack urine, monitor color  DVT prophy, IS  OOB, ambulate  If urine color stable and tolerates diet will d/c later today

## 2018-05-30 LAB — SURGICAL PATHOLOGY STUDY: SIGNIFICANT CHANGE UP

## 2018-06-08 ENCOUNTER — APPOINTMENT (OUTPATIENT)
Dept: UROLOGY | Facility: CLINIC | Age: 68
End: 2018-06-08
Payer: MEDICAID

## 2018-06-08 DIAGNOSIS — N13.30 UNSPECIFIED HYDRONEPHROSIS: ICD-10-CM

## 2018-06-08 PROCEDURE — 99024 POSTOP FOLLOW-UP VISIT: CPT

## 2018-06-08 RX ORDER — SULFAMETHOXAZOLE AND TRIMETHOPRIM 400; 80 MG/1; MG/1
400-80 TABLET ORAL TWICE DAILY
Qty: 14 | Refills: 0 | Status: ACTIVE | COMMUNITY
Start: 2018-06-08 | End: 1900-01-01

## 2018-06-08 RX ORDER — ACETAMINOPHEN AND CODEINE 300; 30 MG/1; MG/1
300-30 TABLET ORAL
Qty: 20 | Refills: 0 | Status: ACTIVE | COMMUNITY
Start: 2018-06-08 | End: 1900-01-01

## 2018-06-11 LAB — BACTERIA UR CULT: ABNORMAL

## 2018-09-14 ENCOUNTER — APPOINTMENT (OUTPATIENT)
Dept: UROLOGY | Facility: CLINIC | Age: 68
End: 2018-09-14

## 2021-06-18 ENCOUNTER — INPATIENT (INPATIENT)
Facility: HOSPITAL | Age: 71
LOS: 2 days | Discharge: HOME | End: 2021-06-21
Attending: INTERNAL MEDICINE | Admitting: INTERNAL MEDICINE
Payer: MEDICARE

## 2021-06-18 VITALS
DIASTOLIC BLOOD PRESSURE: 80 MMHG | OXYGEN SATURATION: 100 % | RESPIRATION RATE: 16 BRPM | HEART RATE: 74 BPM | TEMPERATURE: 98 F | HEIGHT: 62 IN | SYSTOLIC BLOOD PRESSURE: 190 MMHG

## 2021-06-18 DIAGNOSIS — R07.9 CHEST PAIN, UNSPECIFIED: ICD-10-CM

## 2021-06-18 DIAGNOSIS — M54.31 SCIATICA, RIGHT SIDE: ICD-10-CM

## 2021-06-18 DIAGNOSIS — Z29.9 ENCOUNTER FOR PROPHYLACTIC MEASURES, UNSPECIFIED: ICD-10-CM

## 2021-06-18 DIAGNOSIS — R51.9 HEADACHE, UNSPECIFIED: ICD-10-CM

## 2021-06-18 DIAGNOSIS — Z90.710 ACQUIRED ABSENCE OF BOTH CERVIX AND UTERUS: Chronic | ICD-10-CM

## 2021-06-18 DIAGNOSIS — I10 ESSENTIAL (PRIMARY) HYPERTENSION: ICD-10-CM

## 2021-06-18 DIAGNOSIS — Z90.5 ACQUIRED ABSENCE OF KIDNEY: Chronic | ICD-10-CM

## 2021-06-18 LAB
ALBUMIN SERPL ELPH-MCNC: 4 G/DL — SIGNIFICANT CHANGE UP (ref 3.3–5)
ALP SERPL-CCNC: 74 U/L — SIGNIFICANT CHANGE UP (ref 40–120)
ALT FLD-CCNC: 23 U/L — SIGNIFICANT CHANGE UP (ref 4–33)
ANION GAP SERPL CALC-SCNC: 11 MMOL/L — SIGNIFICANT CHANGE UP (ref 7–14)
AST SERPL-CCNC: 26 U/L — SIGNIFICANT CHANGE UP (ref 4–32)
BASOPHILS # BLD AUTO: 0.02 K/UL — SIGNIFICANT CHANGE UP (ref 0–0.2)
BASOPHILS NFR BLD AUTO: 0.3 % — SIGNIFICANT CHANGE UP (ref 0–2)
BILIRUB SERPL-MCNC: 0.3 MG/DL — SIGNIFICANT CHANGE UP (ref 0.2–1.2)
BUN SERPL-MCNC: 30 MG/DL — HIGH (ref 7–23)
CALCIUM SERPL-MCNC: 9.1 MG/DL — SIGNIFICANT CHANGE UP (ref 8.4–10.5)
CHLORIDE SERPL-SCNC: 107 MMOL/L — SIGNIFICANT CHANGE UP (ref 98–107)
CO2 SERPL-SCNC: 24 MMOL/L — SIGNIFICANT CHANGE UP (ref 22–31)
CREAT SERPL-MCNC: 1.27 MG/DL — SIGNIFICANT CHANGE UP (ref 0.5–1.3)
EOSINOPHIL # BLD AUTO: 0.21 K/UL — SIGNIFICANT CHANGE UP (ref 0–0.5)
EOSINOPHIL NFR BLD AUTO: 3.4 % — SIGNIFICANT CHANGE UP (ref 0–6)
GLUCOSE SERPL-MCNC: 91 MG/DL — SIGNIFICANT CHANGE UP (ref 70–99)
HCT VFR BLD CALC: 37.3 % — SIGNIFICANT CHANGE UP (ref 34.5–45)
HGB BLD-MCNC: 11.7 G/DL — SIGNIFICANT CHANGE UP (ref 11.5–15.5)
IANC: 2.43 K/UL — SIGNIFICANT CHANGE UP (ref 1.5–8.5)
IMM GRANULOCYTES NFR BLD AUTO: 0.2 % — SIGNIFICANT CHANGE UP (ref 0–1.5)
LYMPHOCYTES # BLD AUTO: 2.88 K/UL — SIGNIFICANT CHANGE UP (ref 1–3.3)
LYMPHOCYTES # BLD AUTO: 46.4 % — HIGH (ref 13–44)
MAGNESIUM SERPL-MCNC: 2.1 MG/DL — SIGNIFICANT CHANGE UP (ref 1.6–2.6)
MCHC RBC-ENTMCNC: 28.6 PG — SIGNIFICANT CHANGE UP (ref 27–34)
MCHC RBC-ENTMCNC: 31.4 GM/DL — LOW (ref 32–36)
MCV RBC AUTO: 91.2 FL — SIGNIFICANT CHANGE UP (ref 80–100)
MONOCYTES # BLD AUTO: 0.66 K/UL — SIGNIFICANT CHANGE UP (ref 0–0.9)
MONOCYTES NFR BLD AUTO: 10.6 % — SIGNIFICANT CHANGE UP (ref 2–14)
NEUTROPHILS # BLD AUTO: 2.43 K/UL — SIGNIFICANT CHANGE UP (ref 1.8–7.4)
NEUTROPHILS NFR BLD AUTO: 39.1 % — LOW (ref 43–77)
NRBC # BLD: 0 /100 WBCS — SIGNIFICANT CHANGE UP
NRBC # FLD: 0 K/UL — SIGNIFICANT CHANGE UP
NT-PROBNP SERPL-SCNC: 224 PG/ML — SIGNIFICANT CHANGE UP
PLATELET # BLD AUTO: 223 K/UL — SIGNIFICANT CHANGE UP (ref 150–400)
POTASSIUM SERPL-MCNC: 4.1 MMOL/L — SIGNIFICANT CHANGE UP (ref 3.5–5.3)
POTASSIUM SERPL-SCNC: 4.1 MMOL/L — SIGNIFICANT CHANGE UP (ref 3.5–5.3)
PROT SERPL-MCNC: 7.4 G/DL — SIGNIFICANT CHANGE UP (ref 6–8.3)
RBC # BLD: 4.09 M/UL — SIGNIFICANT CHANGE UP (ref 3.8–5.2)
RBC # FLD: 14.6 % — HIGH (ref 10.3–14.5)
SODIUM SERPL-SCNC: 142 MMOL/L — SIGNIFICANT CHANGE UP (ref 135–145)
TROPONIN T, HIGH SENSITIVITY RESULT: 11 NG/L — SIGNIFICANT CHANGE UP
TROPONIN T, HIGH SENSITIVITY RESULT: 11 NG/L — SIGNIFICANT CHANGE UP
WBC # BLD: 6.21 K/UL — SIGNIFICANT CHANGE UP (ref 3.8–10.5)
WBC # FLD AUTO: 6.21 K/UL — SIGNIFICANT CHANGE UP (ref 3.8–10.5)

## 2021-06-18 PROCEDURE — 99223 1ST HOSP IP/OBS HIGH 75: CPT

## 2021-06-18 PROCEDURE — 99285 EMERGENCY DEPT VISIT HI MDM: CPT | Mod: CS,25,GC

## 2021-06-18 PROCEDURE — 93010 ELECTROCARDIOGRAM REPORT: CPT

## 2021-06-18 PROCEDURE — 71045 X-RAY EXAM CHEST 1 VIEW: CPT | Mod: 26

## 2021-06-18 RX ORDER — FAMOTIDINE 10 MG/ML
20 INJECTION INTRAVENOUS ONCE
Refills: 0 | Status: COMPLETED | OUTPATIENT
Start: 2021-06-18 | End: 2021-06-18

## 2021-06-18 RX ORDER — GABAPENTIN 400 MG/1
300 CAPSULE ORAL AT BEDTIME
Refills: 0 | Status: DISCONTINUED | OUTPATIENT
Start: 2021-06-18 | End: 2021-06-21

## 2021-06-18 RX ORDER — ATORVASTATIN CALCIUM 80 MG/1
1 TABLET, FILM COATED ORAL
Qty: 0 | Refills: 0 | DISCHARGE

## 2021-06-18 RX ORDER — GABAPENTIN 400 MG/1
2 CAPSULE ORAL
Qty: 0 | Refills: 0 | DISCHARGE

## 2021-06-18 RX ORDER — CHOLECALCIFEROL (VITAMIN D3) 125 MCG
1 CAPSULE ORAL
Qty: 0 | Refills: 0 | DISCHARGE

## 2021-06-18 RX ORDER — ASPIRIN/CALCIUM CARB/MAGNESIUM 324 MG
81 TABLET ORAL DAILY
Refills: 0 | Status: DISCONTINUED | OUTPATIENT
Start: 2021-06-19 | End: 2021-06-21

## 2021-06-18 RX ORDER — ASPIRIN/CALCIUM CARB/MAGNESIUM 324 MG
162 TABLET ORAL ONCE
Refills: 0 | Status: COMPLETED | OUTPATIENT
Start: 2021-06-18 | End: 2021-06-18

## 2021-06-18 RX ORDER — HEPARIN SODIUM 5000 [USP'U]/ML
5000 INJECTION INTRAVENOUS; SUBCUTANEOUS EVERY 12 HOURS
Refills: 0 | Status: DISCONTINUED | OUTPATIENT
Start: 2021-06-18 | End: 2021-06-21

## 2021-06-18 RX ORDER — DULOXETINE HYDROCHLORIDE 30 MG/1
1 CAPSULE, DELAYED RELEASE ORAL
Qty: 0 | Refills: 0 | DISCHARGE

## 2021-06-18 RX ORDER — METOPROLOL TARTRATE 50 MG
25 TABLET ORAL
Refills: 0 | Status: DISCONTINUED | OUTPATIENT
Start: 2021-06-18 | End: 2021-06-21

## 2021-06-18 RX ORDER — ACETAMINOPHEN 500 MG
650 TABLET ORAL EVERY 6 HOURS
Refills: 0 | Status: DISCONTINUED | OUTPATIENT
Start: 2021-06-18 | End: 2021-06-21

## 2021-06-18 RX ORDER — ATORVASTATIN CALCIUM 80 MG/1
80 TABLET, FILM COATED ORAL AT BEDTIME
Refills: 0 | Status: DISCONTINUED | OUTPATIENT
Start: 2021-06-18 | End: 2021-06-21

## 2021-06-18 RX ADMIN — Medication 30 MILLILITER(S): at 19:22

## 2021-06-18 RX ADMIN — GABAPENTIN 300 MILLIGRAM(S): 400 CAPSULE ORAL at 23:13

## 2021-06-18 RX ADMIN — FAMOTIDINE 20 MILLIGRAM(S): 10 INJECTION INTRAVENOUS at 19:22

## 2021-06-18 RX ADMIN — ATORVASTATIN CALCIUM 80 MILLIGRAM(S): 80 TABLET, FILM COATED ORAL at 23:13

## 2021-06-18 RX ADMIN — Medication 162 MILLIGRAM(S): at 19:22

## 2021-06-18 RX ADMIN — Medication 25 MILLIGRAM(S): at 23:13

## 2021-06-18 NOTE — H&P ADULT - HISTORY OF PRESENT ILLNESS
Pt is a 71 y/o woman with pmhx of HTN, HLD, s/p nephrectomy after hysterectomy complication presents to the ER with progressive chest pain/SOB with exertion over the past 2 months. She has been in her home [facility] and reports over the past week exerting herself has been bringing on a pressure like chest pain which improves with rest. No prior hx of CAD and pt unsure of stress tests. The pain is also reproducible on palpation. She denies any fever, cough, sick contacts, abd pain or diarrhea. Of note she does note intemritten b/l leg swelling with her back leg sometimes with pain. She also reports a chronic intermittent headache that she thinks started from a head trauma injury years ago but also reports some R facial pain with the headache with possible trouble chewing. no eye sight loss or weight loss/fatigue reported. Currently denies any headache or symptoms.

## 2021-06-18 NOTE — ED PROVIDER NOTE - PROGRESS NOTE DETAILS
Resident: Prema Benitez - Pt seen & reassessed.  Pt symptomatically improved. NAD, VSS, pt tolerated PO & ambulated w/steady unassisted gait in the ED.  We discussed the results of ED w/u w/patient.  Patient verbalized understanding of ED course & agreed to admission for further w/u.

## 2021-06-18 NOTE — ED PROVIDER NOTE - ATTENDING CONTRIBUTION TO CARE
71 y/o F with PMH HLD, HTN, nephrectomy p/w chest pain x 2 weeks. pt repots having chest pain in [place] for which she was seen at OSH in [facility] and was recommended to stay. She states she has been having pain in the left chest worse with movement and accompanied w/ SOB. Pain is located in middle of chest w/o radiation pain is 6/10 has not changed in 1 week. Denies taking aspiring since it makes her stomach upset. No prior cath or stress. was vaccinated w/ astrazeneca last dose 4 days prior. denies fever, chills  denies fever, chills, +chest pain, +SOB, abdominal pain, diarrhea, dysuria, syncope, bleeding, new rash,weakness, numbness, blurred vision    ROS  otherwise negative as per HPI  Gen: Awake, Alert, WD, WN, NAD  Head:  NC/AT  Eyes:  PERRL, EOMI, Conjunctiva pink, lids normal, no scleral icterus  ENT: OP clear, no exudates, moist mucus membranes  Neck: supple, nontender, no meningismus, no JVD, trachea midline  Cardiac/CV:  S1 S2, RRR, no M/G/R  Respiratory/Pulm:  CTAB, good air movement, normal resp effort, no wheezes/stridor/retractions/rales/rhonchi  Gastrointestinal/Abdomen:  Soft, nontender, nondistended, +BS, no rebound/guarding  Back:  no CVAT, no MLT  Ext:  warm, well perfused, moving all extremities spontaneously, no peripheral edema, distal pulses intact  Skin: intact, no rash  Neuro:  AAOx3, sensation intact, motor 5/5 x 4 extremities, normal gait, speech clear  MDM as above

## 2021-06-18 NOTE — ED PROVIDER NOTE - OBJECTIVE STATEMENT
69yo F PMH HLD, HTN, s/p nephrectomy and hysterectomy presents with chest pain and SOB. Onset >1 wk ago. COVID vaccinated 3 days ago. Chest pain epigastric/midsternal, radiation down the left arm, assoc with SOB, relieved by resting, worsened with exertional activities. No N/V/F/C/NS/Cough. No abdominal pain. Evaluated by physician in [facility] while she was there for vacation, told that she needs to be admitted to the hospital but did not stay because of her flight back to the [facility]. No abdominal pain. Reports that her kidney was "knicked" during her hysterectomy and removed years later.

## 2021-06-18 NOTE — ED ADULT NURSE REASSESSMENT NOTE - NS ED NURSE REASSESS COMMENT FT1
Report received from day shift RN. Patient is A&OX4. Continues on cardiac monitor. Calm and cooperative. Respirations even and unlabored. Stretcher in lowest position, wheels locked, appropriate side rails in place, call bell in reach.

## 2021-06-18 NOTE — H&P ADULT - PROBLEM SELECTOR PLAN 1
Pain is pressure like, worse with exertion and improves with rest with associated SOB but also reproducible on palpation. EKG appears unchanged from prior, Trops stable and currently chest pain free on room air.   -pt with strong family hx of CAD, has hx of HTN. Will admit for likely stress test.   -continue aspirin (with maalox prn given pt has gi upset) and start statin  -start betablocker for now given elevated bp.  -check echo and monitor on tele.

## 2021-06-18 NOTE — ED ADULT NURSE NOTE - CHIEF COMPLAINT QUOTE
pt recently back from [facility] yesterday morning c.o SOB, chest tightness. States she saw an MD in [facility] and was told had elevated T waves, was supposed to be admitted to hospital over there but daughter had pt returned to [facility]". Pt denies N/V, fever, cough. PMH HLD. Pt fully vaccinated and had negative covid test after traveling.

## 2021-06-18 NOTE — H&P ADULT - NSHPPHYSICALEXAM_GEN_ALL_CORE
PHYSICAL EXAM:  GENERAL: NAD, well-developed, well-nourished  HEAD:  Atraumatic, Normocephalic  EYES: EOMI, PERRLA, conjunctiva and sclera clear  NECK: Supple, No JVD  CHEST/LUNG: Clear to auscultation bilaterally; No wheezes, rales or rhonchi  HEART: Regular rate and rhythm; No murmurs, rubs, or gallops, (+)S1, S2  ABDOMEN: Soft, Nontender, Nondistended; Normal Bowel sounds   EXTREMITIES:  trace pitting edema b/l  PSYCH: normal mood and affect  NEUROLOGY: AAOx3, non-focal  SKIN: No rashes or lesions PHYSICAL EXAM:  GENERAL: NAD, well-developed, well-nourished  HEAD:  Atraumatic, Normocephalic  EYES: EOMI, PERRLA, conjunctiva and sclera clear  NECK: Supple, No JVD  CHEST/LUNG: +upper chest wall tenderness to light palpation. Clear to auscultation bilaterally; No wheezes, rales or rhonchi  HEART: Regular rate and rhythm; No murmurs, rubs, or gallops, (+)S1, S2  ABDOMEN: Soft, Nontender, Nondistended; Normal Bowel sounds   EXTREMITIES:  trace pitting edema b/l  PSYCH: normal mood and affect  NEUROLOGY: AAOx3, non-focal  SKIN: No rashes or lesions

## 2021-06-18 NOTE — H&P ADULT - NSHPREVIEWOFSYSTEMS_GEN_ALL_CORE
REVIEW OF SYSTEMS:    CONSTITUTIONAL: No weakness, fevers or chills  EYES/ENT: No visual changes;  No dysphagia  NECK: No pain or stiffness  RESPIRATORY: +AG. No cough, wheezing, hemoptysis  CARDIOVASCULAR: +intermittent chest pain, +intermittent swelling. NO palpitations;   GASTROINTESTINAL: No abdominal or epigastric pain. No nausea, vomiting, or hematemesis; No diarrhea or constipation. No melena or hematochezia.  GENITOURINARY: No dysuria, frequency or hematuria  NEUROLOGICAL: No numbness or weakness  SKIN: No itching, burning, rashes, or lesions

## 2021-06-18 NOTE — ED PROVIDER NOTE - CLINICAL SUMMARY MEDICAL DECISION MAKING FREE TEXT BOX
69 y/o F with PMH HLD, HTN, nephrectomy p/w chest pain x 2 weeks. pt rep[facility] was seen at OSH in [facility] and was recommended to stay. She states she has been having pain in the left chest worse with movement and accompanied w/ SOB. She has not taken any medications .   r/o acs , heart score of 4+ . will obtain trop, cbc, cmp, aspirin w/ gi medications, cxr, probnp, admission for cards eval

## 2021-06-18 NOTE — H&P ADULT - PROBLEM SELECTOR PLAN 3
chronic for years per pt and reportedly from a traumatic accident in the past, but also reports spreads to R side of face? Reporting unspecified R side chewing difficulty when she has headache but no other systemic systems like visual symptoms. currently not having any symptoms and was able to eat earlier.   -check ESR/CRP, if elevated will call rheum/vascular to eval for possible vasculitis  -tylenol prn for now

## 2021-06-18 NOTE — ED PROVIDER NOTE - PHYSICAL EXAMINATION
G: NAD, cooperative with exam   H: NCAT  R: CTABL, nWOB  C: Nl S1/S2, no mrg, no peripheral edema   A: Soft, NT/ND, no rebound/guarding

## 2021-06-18 NOTE — ED ADULT NURSE NOTE - OBJECTIVE STATEMENT
71y/o female A&ox4, ambulatory received in rm10. pt c/o nonradiating epigastric/midsternal chest pain x1 week, sob on exertion. denies previous cardiac/pulmonary hx. respirations even and unlabored, satting 100% on RA at this time, completing full sentences, airway patent. pt was told in [facility] that she had "elevated T waves" and was brought to [place] by daughter for admission. pt nsr on cardiac monitor. vs as noted in flowsheet. pt in NAD. 20g iv placed in LAC, labs drawn and sent. md at bedside for eval. medicated as per MD orders. bed in lowest position, side rails up, call bell in hand, safety maintained. awaiting further orders. will continue to monitor.

## 2021-06-18 NOTE — H&P ADULT - ASSESSMENT
71 y/o woman with pmhx of HTN, HLD, s/p nephrectomy after hysterectomy complication presents to the ER with progressive chest pain/SOB with exertion over the past 2 months. To be admitted for further management.

## 2021-06-18 NOTE — H&P ADULT - NSHPLABSRESULTS_GEN_ALL_CORE
06-18    142  |  107  |  30<H>  ----------------------------<  91  4.1   |  24  |  1.27    Ca    9.1      18 Jun 2021 19:44  Mg     2.1     06-18    TPro  7.4  /  Alb  4.0  /  TBili  0.3  /  DBili  x   /  AST  26  /  ALT  23  /  AlkPhos  74  06-18                            11.7   6.21  )-----------( 223      ( 18 Jun 2021 19:44 )             37.3             LIVER FUNCTIONS - ( 18 Jun 2021 19:44 )  Alb: 4.0 g/dL / Pro: 7.4 g/dL / ALK PHOS: 74 U/L / ALT: 23 U/L / AST: 26 U/L / GGT: x             CXR:   FINDINGS:    The lungs are clear.    Heart size cannot be accurately assessed in this projection.    No acute osseous findings.      IMPRESSION:    Clear lungs.    EKG: normal sinus rhythm with tw changes 1, avL similar to prior

## 2021-06-19 PROBLEM — E66.9 OBESITY, UNSPECIFIED: Chronic | Status: ACTIVE | Noted: 2018-05-15

## 2021-06-19 PROBLEM — M54.30 SCIATICA, UNSPECIFIED SIDE: Chronic | Status: ACTIVE | Noted: 2018-05-15

## 2021-06-19 PROBLEM — N13.30 UNSPECIFIED HYDRONEPHROSIS: Chronic | Status: ACTIVE | Noted: 2018-05-15

## 2021-06-19 PROBLEM — E78.5 HYPERLIPIDEMIA, UNSPECIFIED: Chronic | Status: ACTIVE | Noted: 2018-05-15

## 2021-06-19 LAB
A1C WITH ESTIMATED AVERAGE GLUCOSE RESULT: 6 % — HIGH (ref 4–5.6)
ALBUMIN SERPL ELPH-MCNC: 3.6 G/DL — SIGNIFICANT CHANGE UP (ref 3.3–5)
ALP SERPL-CCNC: 68 U/L — SIGNIFICANT CHANGE UP (ref 40–120)
ALT FLD-CCNC: 19 U/L — SIGNIFICANT CHANGE UP (ref 4–33)
ANION GAP SERPL CALC-SCNC: 9 MMOL/L — SIGNIFICANT CHANGE UP (ref 7–14)
AST SERPL-CCNC: 24 U/L — SIGNIFICANT CHANGE UP (ref 4–32)
BASOPHILS # BLD AUTO: 0.02 K/UL — SIGNIFICANT CHANGE UP (ref 0–0.2)
BASOPHILS NFR BLD AUTO: 0.4 % — SIGNIFICANT CHANGE UP (ref 0–2)
BILIRUB DIRECT SERPL-MCNC: <0.2 MG/DL — SIGNIFICANT CHANGE UP (ref 0–0.2)
BILIRUB INDIRECT FLD-MCNC: >0.4 MG/DL — SIGNIFICANT CHANGE UP (ref 0–1)
BILIRUB SERPL-MCNC: 0.6 MG/DL — SIGNIFICANT CHANGE UP (ref 0.2–1.2)
BUN SERPL-MCNC: 24 MG/DL — HIGH (ref 7–23)
CALCIUM SERPL-MCNC: 9.7 MG/DL — SIGNIFICANT CHANGE UP (ref 8.4–10.5)
CHLORIDE SERPL-SCNC: 108 MMOL/L — HIGH (ref 98–107)
CO2 SERPL-SCNC: 23 MMOL/L — SIGNIFICANT CHANGE UP (ref 22–31)
COVID-19 SPIKE DOMAIN AB INTERP: POSITIVE
COVID-19 SPIKE DOMAIN ANTIBODY RESULT: 145 U/ML — HIGH
CREAT SERPL-MCNC: 0.9 MG/DL — SIGNIFICANT CHANGE UP (ref 0.5–1.3)
CRP SERPL-MCNC: <4 MG/L — SIGNIFICANT CHANGE UP
EOSINOPHIL # BLD AUTO: 0.19 K/UL — SIGNIFICANT CHANGE UP (ref 0–0.5)
EOSINOPHIL NFR BLD AUTO: 3.4 % — SIGNIFICANT CHANGE UP (ref 0–6)
ERYTHROCYTE [SEDIMENTATION RATE] IN BLOOD: 75 MM/HR — HIGH (ref 4–25)
ESTIMATED AVERAGE GLUCOSE: 126 MG/DL — HIGH (ref 68–114)
GLUCOSE SERPL-MCNC: 87 MG/DL — SIGNIFICANT CHANGE UP (ref 70–99)
HCT VFR BLD CALC: 37.1 % — SIGNIFICANT CHANGE UP (ref 34.5–45)
HCV AB S/CO SERPL IA: 0.2 S/CO — SIGNIFICANT CHANGE UP (ref 0–0.99)
HCV AB SERPL-IMP: SIGNIFICANT CHANGE UP
HGB BLD-MCNC: 11.4 G/DL — LOW (ref 11.5–15.5)
IANC: 2.63 K/UL — SIGNIFICANT CHANGE UP (ref 1.5–8.5)
IMM GRANULOCYTES NFR BLD AUTO: 0.2 % — SIGNIFICANT CHANGE UP (ref 0–1.5)
INR BLD: 1.09 RATIO — SIGNIFICANT CHANGE UP (ref 0.88–1.16)
LYMPHOCYTES # BLD AUTO: 2.26 K/UL — SIGNIFICANT CHANGE UP (ref 1–3.3)
LYMPHOCYTES # BLD AUTO: 40.5 % — SIGNIFICANT CHANGE UP (ref 13–44)
MAGNESIUM SERPL-MCNC: 2.2 MG/DL — SIGNIFICANT CHANGE UP (ref 1.6–2.6)
MCHC RBC-ENTMCNC: 28.6 PG — SIGNIFICANT CHANGE UP (ref 27–34)
MCHC RBC-ENTMCNC: 30.7 GM/DL — LOW (ref 32–36)
MCV RBC AUTO: 93.2 FL — SIGNIFICANT CHANGE UP (ref 80–100)
MONOCYTES # BLD AUTO: 0.47 K/UL — SIGNIFICANT CHANGE UP (ref 0–0.9)
MONOCYTES NFR BLD AUTO: 8.4 % — SIGNIFICANT CHANGE UP (ref 2–14)
NEUTROPHILS # BLD AUTO: 2.63 K/UL — SIGNIFICANT CHANGE UP (ref 1.8–7.4)
NEUTROPHILS NFR BLD AUTO: 47.1 % — SIGNIFICANT CHANGE UP (ref 43–77)
NRBC # BLD: 0 /100 WBCS — SIGNIFICANT CHANGE UP
NRBC # FLD: 0 K/UL — SIGNIFICANT CHANGE UP
PLATELET # BLD AUTO: 220 K/UL — SIGNIFICANT CHANGE UP (ref 150–400)
POTASSIUM SERPL-MCNC: 4 MMOL/L — SIGNIFICANT CHANGE UP (ref 3.5–5.3)
POTASSIUM SERPL-SCNC: 4 MMOL/L — SIGNIFICANT CHANGE UP (ref 3.5–5.3)
PROT SERPL-MCNC: 7.1 G/DL — SIGNIFICANT CHANGE UP (ref 6–8.3)
PROTHROM AB SERPL-ACNC: 12.5 SEC — SIGNIFICANT CHANGE UP (ref 10.6–13.6)
RBC # BLD: 3.98 M/UL — SIGNIFICANT CHANGE UP (ref 3.8–5.2)
RBC # FLD: 14.4 % — SIGNIFICANT CHANGE UP (ref 10.3–14.5)
SARS-COV-2 IGG+IGM SERPL QL IA: 145 U/ML — HIGH
SARS-COV-2 IGG+IGM SERPL QL IA: POSITIVE
SARS-COV-2 RNA SPEC QL NAA+PROBE: SIGNIFICANT CHANGE UP
SODIUM SERPL-SCNC: 140 MMOL/L — SIGNIFICANT CHANGE UP (ref 135–145)
TSH SERPL-MCNC: 4.04 UIU/ML — SIGNIFICANT CHANGE UP (ref 0.27–4.2)
WBC # BLD: 5.58 K/UL — SIGNIFICANT CHANGE UP (ref 3.8–10.5)
WBC # FLD AUTO: 5.58 K/UL — SIGNIFICANT CHANGE UP (ref 3.8–10.5)

## 2021-06-19 PROCEDURE — 93306 TTE W/DOPPLER COMPLETE: CPT | Mod: 26

## 2021-06-19 PROCEDURE — 93970 EXTREMITY STUDY: CPT | Mod: 26

## 2021-06-19 RX ADMIN — Medication 650 MILLIGRAM(S): at 17:22

## 2021-06-19 RX ADMIN — HEPARIN SODIUM 5000 UNIT(S): 5000 INJECTION INTRAVENOUS; SUBCUTANEOUS at 05:29

## 2021-06-19 RX ADMIN — Medication 25 MILLIGRAM(S): at 05:29

## 2021-06-19 RX ADMIN — GABAPENTIN 300 MILLIGRAM(S): 400 CAPSULE ORAL at 20:30

## 2021-06-19 RX ADMIN — Medication 25 MILLIGRAM(S): at 17:23

## 2021-06-19 RX ADMIN — Medication 30 MILLILITER(S): at 12:49

## 2021-06-19 RX ADMIN — ATORVASTATIN CALCIUM 80 MILLIGRAM(S): 80 TABLET, FILM COATED ORAL at 20:30

## 2021-06-19 RX ADMIN — Medication 81 MILLIGRAM(S): at 12:50

## 2021-06-19 RX ADMIN — HEPARIN SODIUM 5000 UNIT(S): 5000 INJECTION INTRAVENOUS; SUBCUTANEOUS at 17:23

## 2021-06-19 NOTE — ED ADULT NURSE REASSESSMENT NOTE - NS ED NURSE REASSESS COMMENT FT1
Assisted patient to bathroom, steady gait observed, ambulated with patient. A&Ox4. Sinus rhythm on cardiac monitor. Report given to ESSU 1 RN.

## 2021-06-19 NOTE — PATIENT PROFILE ADULT - FALLEN IN THE PAST
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 Repeat CTH stable. Required nicardipine gtt in ICU. MRSA bacteremia on Vanc. CT spine pending and MR brain pending.  ID on board. TTE negative. no

## 2021-06-20 ENCOUNTER — TRANSCRIPTION ENCOUNTER (OUTPATIENT)
Age: 71
End: 2021-06-20

## 2021-06-20 PROCEDURE — 93018 CV STRESS TEST I&R ONLY: CPT | Mod: GC

## 2021-06-20 PROCEDURE — 93016 CV STRESS TEST SUPVJ ONLY: CPT | Mod: GC

## 2021-06-20 PROCEDURE — 78452 HT MUSCLE IMAGE SPECT MULT: CPT | Mod: 26

## 2021-06-20 RX ADMIN — Medication 25 MILLIGRAM(S): at 17:13

## 2021-06-20 RX ADMIN — Medication 30 MILLILITER(S): at 12:06

## 2021-06-20 RX ADMIN — ATORVASTATIN CALCIUM 80 MILLIGRAM(S): 80 TABLET, FILM COATED ORAL at 22:36

## 2021-06-20 RX ADMIN — HEPARIN SODIUM 5000 UNIT(S): 5000 INJECTION INTRAVENOUS; SUBCUTANEOUS at 05:30

## 2021-06-20 RX ADMIN — GABAPENTIN 300 MILLIGRAM(S): 400 CAPSULE ORAL at 22:36

## 2021-06-20 RX ADMIN — HEPARIN SODIUM 5000 UNIT(S): 5000 INJECTION INTRAVENOUS; SUBCUTANEOUS at 17:12

## 2021-06-20 RX ADMIN — Medication 25 MILLIGRAM(S): at 05:30

## 2021-06-20 RX ADMIN — Medication 81 MILLIGRAM(S): at 12:06

## 2021-06-20 NOTE — CONSULT NOTE ADULT - ASSESSMENT
71 y/o Armenian F with HTN, HLD, Sciatica L hydro nephrosis s/p nephrectomy after hysterectomy complication presents to the ER with progressive chest pain/SOB with exertion over the past 2 months.   Neuro called for h/o scitatica and HA. states she gets HA often no photo or phono phobia somtimes Nausea but no vomitting.  takes tylenol with good relief. has had for years, no vision changes  ESR mildly elevated 75. A1c 6%  HA likely multifactorial more related to tension.  doubt temporal arteritis. will monitor for vision changes. no temporal artery tenderness. no HA at present   - c/w gabapentin 300mg PO QHS for pain secondary to sciatica.  can increase if necessary but she seems comfertable  - cardiac workup in progress. NST pending   - HA relieved by tylenol.  can also try magnesium 2g if needed and reglan 10.   - if she develops vision changes or temporal artery tenderness will obtain ultrasound and possible bx but  she states she has had for years with no change.   - CTH   - telemetry  - PT/OT, OOBC  - check FS, glucose control <180  - GI/DVT ppx  - Counseling on diet, exercise, and medication adherence was done  - Counseling on smoking cessation and alcohol consumption offered when appropriate.  - Pain assessed and judicious use of narcotics when appropriate was discussed.    - Stroke education given when appropriate.  - Importance of fall prevention discussed.   - Differential diagnosis and plan of care discussed with patient and/or family and primary team  - Thank you for allowing me to participate in the care of this patient. Call with questions.   Isaak Anand MD  Vascular Neurology  Office: [phone]

## 2021-06-20 NOTE — PROGRESS NOTE ADULT - TIME BILLING
- Review of records, telemetry, vital signs and daily labs.   - General and cardiovascular physical examination.  - Generation of cardiovascular treatment plan.  - Coordination of care.      Patient was seen and examined by me on 06/20/2021,interim events noted,labs and radiology studies reviewed.  Antoine Ching MD,FACC.  [address] [place], [place] [id number].  [phone]

## 2021-06-20 NOTE — CONSULT NOTE ADULT - SUBJECTIVE AND OBJECTIVE BOX
Neurology Consult    Reason for Consult: Patient is a 70y old  Female who presents with a chief complaint of Chest pain. HA      HPI:  71 y/o [place] F with HTN, HLD, Sciatica L hydro nephrosis s/p nephrectomy after hysterectomy complication presents to the ER with progressive chest pain/SOB with exertion over the past 2 months. She has been in her home [facility] and reports over the past week exerting herself has been bringing on a pressure like chest pain which improves with rest. No prior hx of CAD and pt unsure of stress tests. The pain is also reproducible on palpation. She denies any fever, cough, sick contacts, abd pain or diarrhea. Of note she does note intemritten b/l leg swelling with her back leg sometimes with pain. She also reports a chronic intermittent headache that she thinks started from a head trauma injury years ago but also reports some R facial pain with the headache with possible trouble chewing. no eye sight loss or weight loss/fatigue reported.   Neuro called for h/o scitatica and HA. states she gets HA often no photo or phono phobia somtimes Nausea but no vomitting.  takes tylenol with good relief.          PAST MEDICAL & SURGICAL HISTORY:  Hydronephrosis  (L)    Sciatica    Hyperlipidemia    Obesity    H/O: hysterectomy  1998    S/p nephrectomy        Allergies: Allergies    No Known Allergies    Intolerances    aspirin (Other)      Social History: Denies toxic habits including tobacco, ETOH or illicit drugs.    Family History: FAMILY HISTORY:  Family history of diabetes mellitus (Sibling)    . No family history of strokes    Medications: MEDICATIONS  (STANDING):  aspirin  chewable 81 milliGRAM(s) Oral daily  atorvastatin 80 milliGRAM(s) Oral at bedtime  gabapentin 300 milliGRAM(s) Oral at bedtime  heparin   Injectable 5000 Unit(s) SubCutaneous every 12 hours  metoprolol tartrate 25 milliGRAM(s) Oral two times a day    MEDICATIONS  (PRN):  acetaminophen   Tablet .. 650 milliGRAM(s) Oral every 6 hours PRN Mild Pain (1 - 3)  aluminum hydroxide/magnesium hydroxide/simethicone Suspension 30 milliLiter(s) Oral every 4 hours PRN Dyspepsia      Review of Systems:  CONSTITUTIONAL:  No weight loss, fever, chills, weakness or fatigue.  HEENT:  Eyes:  No visual loss, blurred vision, double vision or yellow sclera. Ears, Nose, Throat:  No hearing loss, sneezing, congestion, runny nose or sore throat.  SKIN:  No rash or itching.  CARDIOVASCULAR:  +chest pain, chest pressure, chest discomfort. No palpitations or edema.  RESPIRATORY:  No shortness of breath, cough or sputum.  GASTROINTESTINAL:  No anorexia, nausea, vomiting or diarrhea. No abdominal pain or blood.  GENITOURINARY:  No burning on urination or incontinence   NEUROLOGICAL:  +headache, NO dizziness, syncope, paralysis, ataxia, numbness or tingling in the extremities. No change in bowel or bladder control. no limb weakness. no vision changes.   MUSCULOSKELETAL:  No muscle, back pain, joint pain or stiffness.  HEMATOLOGIC:  No anemia, bleeding or bruising.  LYMPHATICS:  No enlarged nodes. No history of splenectomy.  PSYCHIATRIC:  No history of depression or anxiety.  ENDOCRINOLOGIC:  No reports of sweating, cold or heat intolerance. No polyuria or polydipsia.      Vitals:  Vital Signs Last 24 Hrs  T(C): 36.6 (20 Jun 2021 05:29), Max: 36.7 (19 Jun 2021 12:45)  T(F): 97.8 (20 Jun 2021 05:29), Max: 98 (19 Jun 2021 12:45)  HR: 67 (20 Jun 2021 05:29) (67 - 70)  BP: 147/65 (20 Jun 2021 05:29) (138/75 - 147/65)  BP(mean): --  RR: 16 (20 Jun 2021 05:29) (15 - 17)  SpO2: 100% (20 Jun 2021 05:29) (100% - 100%)    General Exam:   General Appearance: Appropriately dressed and in no acute distress       Head: Normocephalic, atraumatic and no dysmorphic features  Ear, Nose, and Throat: Moist mucous membranes  CVS: S1S2+  Resp: No SOB, no wheeze or rhonchi  GI: soft NT/ND  Extremities: No edema or cyanosis  Skin: No bruises or rashes     Neurological Exam:  Mental Status: Awake, alert and oriented x 3.  Able to follow simple and complex verbal commands. Able to name and repeat. fluent speech. No obvious aphasia or dysarthria noted.   Cranial Nerves: PERRL, EOMI, VFFC, sensation V1-V3 intact,  no obvious facial asymmetry, equal elevation of palate, scm/trap 5/5, tongue is midline on protrusion. no obvious papilledema on fundoscopic exam. hearing is grossly intact.   Motor: Normal bulk, tone and strength throughout. Fine finger movements were intact and symmetric. no tremors or drift noted.    Sensation: Intact to light touch and pinprick throughout. no right/left confusion. no extinction to tactile on DSS.  Reflexes: 1+ throughout at biceps, brachioradialis, triceps, patellars and ankles bilaterally and equal. No clonus. R toe and L toe were both downgoing.  Coordination: No dysmetria on FNF  Gait defered     Data/Labs/Imaging which I personally reviewed.     Labs:     CBC Full  -  ( 19 Jun 2021 07:05 )  WBC Count : 5.58 K/uL  RBC Count : 3.98 M/uL  Hemoglobin : 11.4 g/dL  Hematocrit : 37.1 %  Platelet Count - Automated : 220 K/uL  Mean Cell Volume : 93.2 fL  Mean Cell Hemoglobin : 28.6 pg  Mean Cell Hemoglobin Concentration : 30.7 gm/dL  Auto Neutrophil # : 2.63 K/uL  Auto Lymphocyte # : 2.26 K/uL  Auto Monocyte # : 0.47 K/uL  Auto Eosinophil # : 0.19 K/uL  Auto Basophil # : 0.02 K/uL  Auto Neutrophil % : 47.1 %  Auto Lymphocyte % : 40.5 %  Auto Monocyte % : 8.4 %  Auto Eosinophil % : 3.4 %  Auto Basophil % : 0.4 %    06-19    140  |  108<H>  |  24<H>  ----------------------------<  87  4.0   |  23  |  0.90    Ca    9.7      19 Jun 2021 07:05  Mg     2.2     06-19    TPro  7.1  /  Alb  3.6  /  TBili  0.6  /  DBili  <0.2  /  AST  24  /  ALT  19  /  AlkPhos  68  06-19    LIVER FUNCTIONS - ( 19 Jun 2021 07:05 )  Alb: 3.6 g/dL / Pro: 7.1 g/dL / ALK PHOS: 68 U/L / ALT: 19 U/L / AST: 24 U/L / GGT: x           PT/INR - ( 19 Jun 2021 07:05 )   PT: 12.5 sec;   INR: 1.09 ratio

## 2021-06-21 ENCOUNTER — TRANSCRIPTION ENCOUNTER (OUTPATIENT)
Age: 71
End: 2021-06-21

## 2021-06-21 VITALS
RESPIRATION RATE: 16 BRPM | SYSTOLIC BLOOD PRESSURE: 158 MMHG | HEART RATE: 69 BPM | OXYGEN SATURATION: 100 % | TEMPERATURE: 98 F | DIASTOLIC BLOOD PRESSURE: 69 MMHG

## 2021-06-21 PROCEDURE — 70450 CT HEAD/BRAIN W/O DYE: CPT | Mod: 26

## 2021-06-21 RX ORDER — GABAPENTIN 400 MG/1
3 CAPSULE ORAL
Qty: 0 | Refills: 0 | DISCHARGE

## 2021-06-21 RX ORDER — METOPROLOL TARTRATE 50 MG
1 TABLET ORAL
Qty: 60 | Refills: 0
Start: 2021-06-21 | End: 2021-07-20

## 2021-06-21 RX ORDER — PANTOPRAZOLE SODIUM 20 MG/1
1 TABLET, DELAYED RELEASE ORAL
Qty: 30 | Refills: 0
Start: 2021-06-21 | End: 2021-07-20

## 2021-06-21 RX ORDER — ASPIRIN/CALCIUM CARB/MAGNESIUM 324 MG
1 TABLET ORAL
Qty: 30 | Refills: 0
Start: 2021-06-21 | End: 2021-07-20

## 2021-06-21 RX ORDER — ATORVASTATIN CALCIUM 80 MG/1
1 TABLET, FILM COATED ORAL
Qty: 30 | Refills: 0
Start: 2021-06-21 | End: 2021-07-20

## 2021-06-21 RX ORDER — GABAPENTIN 400 MG/1
1 CAPSULE ORAL
Qty: 0 | Refills: 0 | DISCHARGE
Start: 2021-06-21

## 2021-06-21 RX ADMIN — Medication 81 MILLIGRAM(S): at 11:49

## 2021-06-21 RX ADMIN — HEPARIN SODIUM 5000 UNIT(S): 5000 INJECTION INTRAVENOUS; SUBCUTANEOUS at 05:23

## 2021-06-21 RX ADMIN — Medication 30 MILLILITER(S): at 11:49

## 2021-06-21 RX ADMIN — Medication 25 MILLIGRAM(S): at 05:24

## 2021-06-21 RX ADMIN — Medication 25 MILLIGRAM(S): at 17:11

## 2021-06-21 RX ADMIN — HEPARIN SODIUM 5000 UNIT(S): 5000 INJECTION INTRAVENOUS; SUBCUTANEOUS at 17:11

## 2021-06-21 NOTE — DISCHARGE NOTE PROVIDER - NSDCCPCAREPLAN_GEN_ALL_CORE_FT
PRINCIPAL DISCHARGE DIAGNOSIS  Diagnosis: Chest pain  Assessment and Plan of Treatment: echo with normal function   stress test was performed and was a normal study   continue aspirin and lipitor   follow up with cardiology as outpt         SECONDARY DISCHARGE DIAGNOSES  Diagnosis: Sciatica  Assessment and Plan of Treatment: neurotin 300 daily  follow up with neurology as an outpt for back pain and headaches .

## 2021-06-21 NOTE — DISCHARGE NOTE PROVIDER - CARE PROVIDER_API CALL
Antoine Ching)  Cardiology  [address] [place] [address]  Phone: [phone]  Fax: [phone]  Follow Up Time: 1 week    Isaak Anand)  Neurology; Vascular Neurology  [address] [facility], [address]  [place] [address]  Phone: [phone]  Fax: [phone]  Follow Up Time: 1 week

## 2021-06-21 NOTE — PROGRESS NOTE ADULT - PROBLEM SELECTOR PLAN 1
chest pain free at present\cont aspirin  cards f/u - ischemic w/u as per cards    cards cleared pt for dc
chest pain free at present\cont apsirin  cards f/u - ischemic w/u as per cards

## 2021-06-21 NOTE — PROGRESS NOTE ADULT - ASSESSMENT
69 y/o woman with pmhx of HTN, HLD, s/p nephrectomy after hysterectomy complication presents to the ER with progressive chest pain/SOB with exertion over the past 2 months.       Problem/Plan - 1:  ·  Problem: Chest pain, ACS.  Plan: chest pain free at present continueapsirin  No evidence for cardiac injury  TTE EF 66%  For ischemic work-up NST today    Problem/Plan - 2:  ·  Problem: Bilateral sciatica.  Plan: continue gabapentin. Pt also reports mild swelling and back of leg pain.     Problem/Plan - 3:  ·  Problem: Chronic nonintractable headache, unspecified headache type.  Plan: chronic for years per pt and reportedly from a traumatic accident in the past, but also reports spreads to R side of face? Reporting unspecified R side chewing difficulty when she has headache but no other systemic systems like visual symptoms. currently not having any symptoms and was able to eat earlier.   -will obtain Neuro eval.     Problem/Plan - 4:  ·  Problem: Hypertension, unspecified type.  Plan: bb.     Problem/Plan - 5:  ·  Problem: Need for prophylactic measure.  Plan: heparin subcu for dvt ppx, dash diet.   
71 y/o Dominican F with HTN, HLD, Sciatica L hydro nephrosis s/p nephrectomy after hysterectomy complication presents to the ER with progressive chest pain/SOB with exertion over the past 2 months.   Neuro called for h/o scitatica and HA. states she gets HA often no photo or phono phobia somtimes Nausea but no vomitting.  takes tylenol with good relief. has had for years, no vision changes  ESR mildly elevated 75. A1c 6%  HA likely multifactorial more related to tension.  doubt temporal arteritis. will monitor for vision changes. no temporal artery tenderness. no HA at present feels at her baseline.   - c/w gabapentin 300mg PO QHS for pain secondary to sciatica.  can increase if necessary but she seems comfertable  - cardiac workup in progress. NST pending   - HA relieved by tylenol.  can also try magnesium 2g if needed and reglan 10.   - if she develops vision changes or temporal artery tenderness will obtain ultrasound and possible bx but  she states she has had for years with no change.   - CTH   - telemetry  - PT/OT, OOBC  - check FS, glucose control <180  - GI/DVT ppx  - Counseling on diet, exercise, and medication adherence was done  - Counseling on smoking cessation and alcohol consumption offered when appropriate.  - Pain assessed and judicious use of narcotics when appropriate was discussed.    - Stroke education given when appropriate.  - Importance of fall prevention discussed.   - Differential diagnosis and plan of care discussed with patient and/or family and primary team  - Thank you for allowing me to participate in the care of this patient. Call with questions.   - possible d/c if CTH neg   Isaak Anand MD  Vascular Neurology  Office: [phone]   
69 y/o woman with pmhx of HTN, HLD, s/p nephrectomy after hysterectomy complication presents to the ER with progressive chest pain/SOB with exertion over the past 2 months. To be admitted for further management. 
69 y/o woman with pmhx of HTN, HLD, s/p nephrectomy after hysterectomy complication presents to the ER with progressive chest pain/SOB with exertion over the past 2 months. To be admitted for further management.

## 2021-06-21 NOTE — DISCHARGE NOTE PROVIDER - PROVIDER TOKENS
PROVIDER:[TOKEN:[[id number]],FOLLOWUP:[1 week]],PROVIDER:[TOKEN:[[id number]],FOLLOWUP:[1 week]]

## 2021-06-21 NOTE — DISCHARGE NOTE NURSING/CASE MANAGEMENT/SOCIAL WORK - PATIENT PORTAL LINK FT
You can access the FollowMyHealth Patient Portal offered by [facility] by registering at the following website: [email or web address]. By joining [facility]’s FollowMyHealth portal, you will also be able to view your health information using other applications (apps) compatible with our system.

## 2021-06-21 NOTE — DISCHARGE NOTE PROVIDER - NSDCMRMEDTOKEN_GEN_ALL_CORE_FT
aspirin 81 mg oral tablet, chewable: 1 tab(s) orally once a day  atorvastatin 80 mg oral tablet: 1 tab(s) orally once a day (at bedtime)  gabapentin 300 mg oral capsule: 1 cap(s) orally once a day (at bedtime)  metoprolol tartrate 25 mg oral tablet: 1 tab(s) orally 2 times a day   aspirin 81 mg oral tablet, chewable: 1 tab(s) orally once a day  atorvastatin 80 mg oral tablet: 1 tab(s) orally once a day (at bedtime)  gabapentin 300 mg oral capsule: 1 cap(s) orally once a day (at bedtime)  metoprolol tartrate 25 mg oral tablet: 1 tab(s) orally 2 times a day  Protonix 40 mg oral delayed release tablet: 1 tab(s) orally once a day

## 2021-06-21 NOTE — PROGRESS NOTE ADULT - PROBLEM SELECTOR PLAN 2
continue gabapentin. Pt also reports mild swelling and back of leg pain.
continue gabapentin. Pt also reports mild swelling and back of leg pain.

## 2021-06-21 NOTE — PROGRESS NOTE ADULT - SUBJECTIVE AND OBJECTIVE BOX
Neurology Progress Note    S: Patient seen and examined. No new events overnight. patient denied CP, SOB, HA or pain.     Medication:  acetaminophen   Tablet .. 650 milliGRAM(s) Oral every 6 hours PRN  aluminum hydroxide/magnesium hydroxide/simethicone Suspension 30 milliLiter(s) Oral every 4 hours PRN  aspirin  chewable 81 milliGRAM(s) Oral daily  atorvastatin 80 milliGRAM(s) Oral at bedtime  gabapentin 300 milliGRAM(s) Oral at bedtime  heparin   Injectable 5000 Unit(s) SubCutaneous every 12 hours  metoprolol tartrate 25 milliGRAM(s) Oral two times a day      Vitals:  Vital Signs Last 24 Hrs  T(C): 37.1 (21 Jun 2021 05:22), Max: 37.1 (21 Jun 2021 05:22)  T(F): 98.7 (21 Jun 2021 05:22), Max: 98.7 (21 Jun 2021 05:22)  HR: 65 (21 Jun 2021 05:22) (65 - 72)  BP: 137/67 (21 Jun 2021 05:22) (137/67 - 152/79)  BP(mean): --  RR: 16 (21 Jun 2021 05:22) (16 - 18)  SpO2: 100% (21 Jun 2021 05:22) (99% - 100%)    General Exam:   General Appearance: Appropriately dressed and in no acute distress       Head: Normocephalic, atraumatic and no dysmorphic features  Ear, Nose, and Throat: Moist mucous membranes  CVS: S1S2+  Resp: No SOB, no wheeze or rhonchi  GI: soft NT/ND  Extremities: No edema or cyanosis  Skin: No bruises or rashes     Neurological Exam:  Mental Status: Awake, alert and oriented x 3.  Able to follow simple and complex verbal commands. Able to name and repeat. fluent speech. No obvious aphasia or dysarthria noted.   Cranial Nerves: PERRL, EOMI, VFFC, sensation V1-V3 intact,  no obvious facial asymmetry, equal elevation of palate, scm/trap 5/5, tongue is midline on protrusion. no obvious papilledema on fundoscopic exam. hearing is grossly intact.   Motor: Normal bulk, tone and strength throughout. Fine finger movements were intact and symmetric. no tremors or drift noted.    Sensation: Intact to light touch and pinprick throughout. no right/left confusion. no extinction to tactile on DSS.  Reflexes: 1+ throughout at biceps, brachioradialis, triceps, patellars and ankles bilaterally and equal. No clonus. R toe and L toe were both downgoing.  Coordination: No dysmetria on FNF  Gait defered     I personally reviewed the below data/images/labs:    NO NEW LABS TODAY                  
DATE OF SERVICE:  06/20/2021  Patient was seen and examined on 06/20/2021    .Interim events noted.Consultant notes ,Labs,Telemetry reviewed by me      PRESENTING CC:Chest pain    HPI and HOSPITAL COURSE: HPI:  Pt is a 69 y/o woman with pmhx of HTN, HLD, s/p nephrectomy after hysterectomy complication presents to the ER with progressive chest pain/SOB with exertion over the past 2 months. She has been in her home [facility] and reports over the past week exerting herself has been bringing on a pressure like chest pain which improves with rest. No prior hx of CAD and pt unsure of stress tests. The pain is also reproducible on palpation. She denies any fever, cough, sick contacts, abd pain or diarrhea. Of note she does note intemritten b/l leg swelling with her back leg sometimes with pain. She also reports a chronic intermittent headache that she thinks started from a head trauma injury years ago but also reports some R facial pain with the headache with possible trouble chewing. no eye sight loss or weight loss/fatigue reported. Currently denies any headache or symptoms.  (18 Jun 2021 22:32)      INTERIM EVENTS:Awake alert no chest pain still refers to AG      PMH -reviewed admission note, no change since admission  Heart Failure: Acute [ ] Chronic [ ] Acute on Chronic [ ] Diastolic [ ] Systolic [ ] Combined Systolic and Diastolic[ ]  SYLVIE[ ]  ATN[ ]  CKD I [ ] CKDII [ ] CKD III [ ] CKD IV [ ] CKD V [ ] ESRD[ ]  HTN[ ] CVA[ ] DM[ ] COPD[ ] COVID[ ] AF[ ]  PPM[ ] ICD[ ]    MEDICATIONS  (STANDING):  aspirin  chewable 81 milliGRAM(s) Oral daily  atorvastatin 80 milliGRAM(s) Oral at bedtime  gabapentin 300 milliGRAM(s) Oral at bedtime  heparin   Injectable 5000 Unit(s) SubCutaneous every 12 hours  metoprolol tartrate 25 milliGRAM(s) Oral two times a day    MEDICATIONS  (PRN):  acetaminophen   Tablet .. 650 milliGRAM(s) Oral every 6 hours PRN Mild Pain (1 - 3)  aluminum hydroxide/magnesium hydroxide/simethicone Suspension 30 milliLiter(s) Oral every 4 hours PRN Dyspepsia            REVIEW OF SYSTEMS:  Constitutional: [ ] fever, [ ]weight loss,  [ x]fatigue  Eyes: [ ] visual changes  Respiratory: [x ]shortness of breath;  [ ] cough, [ ]wheezing, [ ]chills, [ ]hemoptysis  Cardiovascular: [ ] chest pain, [ ]palpitations, [ ]dizziness,  [ ]leg swelling[ ]orthopnea[ ]PND  Gastrointestinal: [ ] abdominal pain, [ ]nausea, [ ]vomiting,  [ ]diarrhea [ ]Constipation [ ]Melena  Genitourinary: [ ] dysuria, [ ] hematuria [ ]Diaz  Neurologic: [ ] headaches [ ] tremors[ ]weakness [ ]Paralysis Right[ ] Left[ ]  Skin: [ ] itching, [ ]burning, [ ] rashes  Endocrine: [ ] heat or cold intolerance  Musculoskeletal: [ ] joint pain or swelling; [ ] muscle, back, or extremity pain  Psychiatric: [ ] depression, [ ]anxiety, [ ]mood swings, or [ ]difficulty sleeping  Hematologic: [ ] easy bruising, [ ] bleeding gums    [x] All remaining systems negative except as per above.   [ ]Unable to obtain.    Vital Signs Last 24 Hrs  T(C): 36.6 (20 Jun 2021 05:29), Max: 36.7 (19 Jun 2021 12:45)  T(F): 97.8 (20 Jun 2021 05:29), Max: 98 (19 Jun 2021 12:45)  HR: 67 (20 Jun 2021 05:29) (67 - 70)  BP: 147/65 (20 Jun 2021 05:29) (138/75 - 147/65)  RR: 16 (20 Jun 2021 05:29) (15 - 17)  SpO2: 100% (20 Jun 2021 05:29) (100% - 100%)  I&O's Summary      PHYSICAL EXAM:  General: No acute distress BMI-27  HEENT: EOMI, PERRL  Neck: Supple, [ ] JVD  Lungs: Equal air entry bilaterally; [ ] rales [ ] wheezing [ ] rhonchi  Heart: Regular rate and rhythm; [x ] murmur   3/6 [x ] systolic [ ] diastolic [ ] radiation[ ] rubs [ ]  gallops  Abdomen: Nontender, bowel sounds present  Extremities: No clubbing, cyanosis, [ ] edema [ ]Pulses  equal and intact  Nervous system:  Alert & Oriented X3, no focal deficits  Psychiatric: Normal affect  Skin: No rashes or lesions    LABS:  06-19    140  |  108<H>  |  24<H>  ----------------------------<  87  4.0   |  23  |  0.90    Ca    9.7      19 Jun 2021 07:05  Mg     2.2     06-19    TPro  7.1  /  Alb  3.6  /  TBili  0.6  /  DBili  <0.2  /  AST  24  /  ALT  19  /  AlkPhos  68  06-19    Creatinine Trend: 0.90<--, 1.27<--                        11.4   5.58  )-----------( 220      ( 19 Jun 2021 07:05 )             37.1     PT/INR - ( 19 Jun 2021 07:05 )   PT: 12.5 sec;   INR: 1.09 ratio        Serum Pro-Brain Natriuretic Peptide: 224 pg/mL (06-18-21 @ 19:44)          TELEMETRY:Reviewed monitor tracings-Sinus rhythm    ECHO:< from: Transthoracic Echocardiogram (06.19.21 @ 09:46) >  Study Date: 6/19/2021CONCLUSIONS:  1. Normal left ventricular systolic function. No segmental wall motion abnormalities.  2. Normal left ventricular diastolic function. EF 66%  3. Normal right ventricular size and function.        
    SUBJECTIVE / OVERNIGHT EVENTS: pt seen and examined      MEDICATIONS  (STANDING):  aspirin  chewable 81 milliGRAM(s) Oral daily  atorvastatin 80 milliGRAM(s) Oral at bedtime  gabapentin 300 milliGRAM(s) Oral at bedtime  heparin   Injectable 5000 Unit(s) SubCutaneous every 12 hours  metoprolol tartrate 25 milliGRAM(s) Oral two times a day    MEDICATIONS  (PRN):  acetaminophen   Tablet .. 650 milliGRAM(s) Oral every 6 hours PRN Mild Pain (1 - 3)  aluminum hydroxide/magnesium hydroxide/simethicone Suspension 30 milliLiter(s) Oral every 4 hours PRN Dyspepsia    Vital Signs Last 24 Hrs  T(C): 36.4 (19 Jun 2021 17:27), Max: 36.7 (19 Jun 2021 05:25)  T(F): 97.6 (19 Jun 2021 17:27), Max: 98 (19 Jun 2021 05:25)  HR: 70 (19 Jun 2021 17:27) (60 - 70)  BP: 144/68 (19 Jun 2021 17:27) (136/63 - 159/52)  BP(mean): --  RR: 16 (19 Jun 2021 17:27) (16 - 18)  SpO2: 100% (19 Jun 2021 17:27) (100% - 100%)    CAPILLARY BLOOD GLUCOSE        I&O's Summary      Constitutional: No fever, fatigue  Skin: No rash.  Eyes: No recent vision problems or eye pain.  ENT: No congestion, ear pain, or sore throat.  Cardiovascular: No chest pain or palpation.  Respiratory: No cough, shortness of breath, congestion, or wheezing.  Gastrointestinal: No abdominal pain, nausea, vomiting, or diarrhea.  Genitourinary: No dysuria.  Musculoskeletal: No joint swelling.  Neurologic: No headache.    PHYSICAL EXAM:  GENERAL: NAD  EYES: EOMI, PERRLA  NECK: Supple, No JVD  CHEST/LUNG: dec breath sounds at bases  HEART:  S1 , S2 +  ABDOMEN: soft , bs+  EXTREMITIES: no edema  NEUROLOGY:alert awake      LABS:                        11.4   5.58  )-----------( 220      ( 19 Jun 2021 07:05 )             37.1     06-19    140  |  108<H>  |  24<H>  ----------------------------<  87  4.0   |  23  |  0.90    Ca    9.7      19 Jun 2021 07:05  Mg     2.2     06-19    TPro  7.1  /  Alb  3.6  /  TBili  0.6  /  DBili  <0.2  /  AST  24  /  ALT  19  /  AlkPhos  68  06-19    PT/INR - ( 19 Jun 2021 07:05 )   PT: 12.5 sec;   INR: 1.09 ratio                   RADIOLOGY & ADDITIONAL TESTS:    Imaging Personally Reviewed:    Consultant(s) Notes Reviewed:      Care Discussed with Consultants/Other Providers:  
    SUBJECTIVE / OVERNIGHT EVENTS: pt seen and examined    MEDICATIONS  (STANDING):  aspirin  chewable 81 milliGRAM(s) Oral daily  atorvastatin 80 milliGRAM(s) Oral at bedtime  gabapentin 300 milliGRAM(s) Oral at bedtime  heparin   Injectable 5000 Unit(s) SubCutaneous every 12 hours  metoprolol tartrate 25 milliGRAM(s) Oral two times a day    MEDICATIONS  (PRN):  acetaminophen   Tablet .. 650 milliGRAM(s) Oral every 6 hours PRN Mild Pain (1 - 3)  aluminum hydroxide/magnesium hydroxide/simethicone Suspension 30 milliLiter(s) Oral every 4 hours PRN Dyspepsia    Vital Signs Last 24 Hrs  T(C): 36.6 (21 Jun 2021 17:09), Max: 37.1 (21 Jun 2021 05:22)  T(F): 97.9 (21 Jun 2021 17:09), Max: 98.7 (21 Jun 2021 05:22)  HR: 69 (21 Jun 2021 17:09) (65 - 69)  BP: 158/69 (21 Jun 2021 17:09) (137/67 - 158/69)  BP(mean): --  RR: 16 (21 Jun 2021 17:09) (16 - 16)  SpO2: 100% (21 Jun 2021 17:09) (100% - 100%)    Constitutional: No fever, fatigue  Skin: No rash.  Eyes: No recent vision problems or eye pain.  ENT: No congestion, ear pain, or sore throat.  Cardiovascular: No chest pain or palpation.  Respiratory: No cough, shortness of breath, congestion, or wheezing.  Gastrointestinal: No abdominal pain, nausea, vomiting, or diarrhea.  Genitourinary: No dysuria.  Musculoskeletal: No joint swelling.  Neurologic: No headache.    PHYSICAL EXAM:  GENERAL: NAD  EYES: EOMI, PERRLA  NECK: Supple, No JVD  CHEST/LUNG: dec breath sounds at bases  HEART:  S1 , S2 +  ABDOMEN: soft , bs+  EXTREMITIES: no edema  NEUROLOGY:alert awake      LABS:        Creatinine Trend: 0.90 <--, 1.27 <--    Urine Studies:                    RADIOLOGY & ADDITIONAL TESTS:    Imaging Personally Reviewed:    Consultant(s) Notes Reviewed:      Care Discussed with Consultants/Other Providers:

## 2021-06-21 NOTE — PROGRESS NOTE ADULT - PROBLEM SELECTOR PLAN 3
chronic for years per pt and reportedly from a traumatic accident in the past, but also reports spreads to R side of face? Reporting unspecified R side chewing difficulty when she has headache but no other systemic systems like visual symptoms. currently not having any symptoms and was able to eat earlier.   -will obtain Neuro eval
chronic for years per pt and reportedly from a traumatic accident in the past, but also reports spreads to R side of face? Reporting unspecified R side chewing difficulty when she has headache but no other systemic systems like visual symptoms. currently not having any symptoms and was able to eat earlier.   -will obtain Neuro eval

## 2021-06-21 NOTE — DISCHARGE NOTE PROVIDER - HOSPITAL COURSE
Pt is a 69 y/o woman with pmhx of HTN, HLD, s/p nephrectomy after hysterectomy complication presents to the ER with progressive chest pain/SOB with exertion over the past 2 months. She has been in her home [facility] and reports over the past week exerting herself has been bringing on a pressure like chest pain which improves with rest. No prior hx of CAD and pt unsure of stress tests. The pain is also reproducible on palpation. She denies any fever, cough, sick contacts, abd pain or diarrhea. Of note she does note intemritten b/l leg swelling with her back leg sometimes with pain. She also reports a chronic intermittent headache that she thinks started from a head trauma injury years ago but also reports some R facial pain with the headache with possible trouble chewing. no eye sight loss or weight loss/fatigue reported. Currently denies any headache or symptoms.      Chest pain,  chest pain free at present continue aspirin  No evidence for cardiac injury  TTE EF 66%  Nuclear stress - normal     Problem/Plan - 2:  ·  Problem: Bilateral sciatica.     continue gabapentin - Pt also reports mild swelling and back of leg pain.     Problem/Plan - 3:  Chronic radha intractable headache -   chronic for years per pt and reportedly from a traumatic accident in the past, but also reports spreads to R side of face? Reporting unspecified R side chewing difficulty when she has headache but no other systemic systems like visual symptoms. currently not having any symptoms and was able to eat earlier.   - SR mildly elevated 75. A1c 6%  HA likely multifactorial more related to tension.  doubt temporal arteritis. will monitor for vision changes. no temporal artery tenderness. no HA at present feels at her baseline.   - c/w gabapentin 300mg PO QHS for pain secondary to sciatica.  can increase if necessary but she seems comfortable .   - HA relieved by tylenol.  can also try magnesium 2g if needed and reglan 10.   - if she develops vision changes or temporal artery tenderness will obtain ultrasound and possible bx but  she states she has had for years with no change.   - CTH negative , - telemetry ,  PT/OT, OOBC  - check FS, glucose control <180, - GI/DVT ppx  - Counseling on diet, exercise, and medication adherence was done, - Counseling on smoking cessation and alcohol consumption offered when appropriate.  - Importance of fall prevention discussed.  Differential diagnosis and plan of care discussed with patient and/or family and primary team.       Problem/Plan - 4:  Hypertension, unspecified type.    bb.     Problem/Plan - 5:  Need for prophylactic measure.   heparin subcu for dvt ppx, dash diet.    Case d/w Ching patient is stable for discharge home - follow up as outpt . Will discharge on Aspirin and lipitor Pt is a 71 y/o woman with pmhx of HTN, HLD, s/p nephrectomy after hysterectomy complication presents to the ER with progressive chest pain/SOB with exertion over the past 2 months. She has been in her home [facility] and reports over the past week exerting herself has been bringing on a pressure like chest pain which improves with rest. No prior hx of CAD and pt unsure of stress tests. The pain is also reproducible on palpation. She denies any fever, cough, sick contacts, abd pain or diarrhea. Of note she does note intemritten b/l leg swelling with her back leg sometimes with pain. She also reports a chronic intermittent headache that she thinks started from a head trauma injury years ago but also reports some R facial pain with the headache with possible trouble chewing. no eye sight loss or weight loss/fatigue reported. Currently denies any headache or symptoms.      Chest pain,  chest pain free at present continue aspirin  No evidence for cardiac injury  TTE EF 66%  Nuclear stress - normal     Problem/Plan - 2:  ·  Problem: Bilateral sciatica.     continue gabapentin - Pt also reports mild swelling and back of leg pain.     Problem/Plan - 3:  Chronic radha intractable headache -   chronic for years per pt and reportedly from a traumatic accident in the past, but also reports spreads to R side of face? Reporting unspecified R side chewing difficulty when she has headache but no other systemic systems like visual symptoms. currently not having any symptoms and was able to eat earlier.   - SR mildly elevated 75. A1c 6%  HA likely multifactorial more related to tension.  doubt temporal arteritis. will monitor for vision changes. no temporal artery tenderness. no HA at present feels at her baseline.   - c/w gabapentin 300mg PO QHS for pain secondary to sciatica.  can increase if necessary but she seems comfortable .   - HA relieved by tylenol.  can also try magnesium 2g if needed and reglan 10.   - if she develops vision changes or temporal artery tenderness will obtain ultrasound and possible bx but  she states she has had for years with no change.   - CTH negative , - telemetry ,  PT/OT, OOBC  - check FS, glucose control <180, - GI/DVT ppx  - Counseling on diet, exercise, and medication adherence was done, - Counseling on smoking cessation and alcohol consumption offered when appropriate.  - Importance of fall prevention discussed.  Differential diagnosis and plan of care discussed with patient and/or family and primary team.       Problem/Plan - 4:  Hypertension, unspecified type.    bb.     Problem/Plan - 5:  Need for prophylactic measure.   heparin subcu for dvt ppx, dash diet.    Case d/w Ching patient is stable for discharge home - follow up as outpt . Will discharge on Aspirin and lipitor . Patient endorses abdominal pain with aspirin that is relieved with maalox. will also give PPI upon discharge

## 2021-06-21 NOTE — PROGRESS NOTE ADULT - PROBLEM SELECTOR PROBLEM 3
Chronic nonintractable headache, unspecified headache type
Chronic nonintractable headache, unspecified headache type

## 2023-04-21 NOTE — ASU PATIENT PROFILE, ADULT - NSALCOHOLPROBLEMSRELYN_GEN_A_CORE_SD
04/21/2023: Patient is hospital day #2 receiving 0.45% NS with 20 KCL at 100 ml/hr. Has not had any nausea or vomiting since admission. Denies any abdominal pain at present. No new complaints. Labs today: WBC down to 19.54, K+ up to 3.0, BUN/CR improved to 21/0.88. Urine culture and blood cultures are pending.     04/22/2023: Hospital day #3. Continues to receive 0.45% NS with 20 KCL at 100 ml/hr and Rocephin 1 gm IV daily. Had episodes of nausea yesterday, but no vomiting. Denies any pain at present. Continues to have bouts with elevated blood pressure.  Labs today: WBC still elevated at 19.18, Na 138, K+ improved to 3.5, BUN/CR up to 23/1.21. Blood culture no growth at 48 hours and urine culture pending. No new complaints today.    04/23/2023: Hospital day #4. Patient has not had any further nausea or vomiting. Reports feeling much better today. BP has VS stable. BP has improved. Labs today: WBC down to 13.43, H&H stable at 11.3/34.9, Na 141, K+ 3.3, BUN/CR 15/0.91.   
no

## 2024-11-21 NOTE — PATIENT PROFILE ADULT - IS PATIENT POST-MENOPAUSAL?
Snacking close to bedtime can make your symptoms worse.  Avoid foods that make your symptoms worse. These may include chocolate, mint, alcohol, pepper, spicy foods, high-fat foods, or drinks with caffeine in them, such as tea, coffee, malina, or energy drinks. If your symptoms are worse after you eat a certain food, you may want to stop eating it to see if your symptoms get better.  Try to quit smoking or chewing tobacco, or cut back as much as you can. If you need help quitting, talk to your doctor about quit-tobacco programs and medicines. These can increase your chances of quitting for good.  If you have GERD symptoms while trying to sleep, raise the head of your bed 6 to 8 inches by putting the frame on blocks or placing a foam wedge under the head of your mattress. (Adding extra pillows does not work.)  Do not wear tight clothing around your middle.  When should you call for help?   Call 911  anytime you think you may need emergency care. For example, call if:    You passed out (lost consciousness).   Call your doctor now or seek immediate medical care if:    You have new or worse belly pain.     Your stools are black and tarlike or have streaks of blood.     You vomit blood.   Watch closely for changes in your health, and be sure to contact your doctor if:    Your symptoms have not improved after 2 weeks.     Food seems to catch in your throat or chest.   Where can you learn more?  Go to https://www.[email or web address].net/patientEd and enter T927 to learn more about \"Gastroesophageal Reflux Disease (GERD): Care Instructions.\"  Current as of: October 19, 2023  Content Version: 14.2  © 2024 [employer].   Care instructions adapted under license by [employer]. If you have questions about a medical condition or this instruction, always ask your healthcare professional. [employer] disclaims any warranty or liability for your use of this information.          yes